# Patient Record
Sex: MALE | Employment: OTHER | URBAN - METROPOLITAN AREA
[De-identification: names, ages, dates, MRNs, and addresses within clinical notes are randomized per-mention and may not be internally consistent; named-entity substitution may affect disease eponyms.]

---

## 2017-05-11 ENCOUNTER — CARE COORDINATION (OUTPATIENT)
Dept: CARDIOLOGY | Facility: CLINIC | Age: 72
End: 2017-05-11

## 2017-05-11 DIAGNOSIS — I10 HTN (HYPERTENSION): ICD-10-CM

## 2017-05-11 RX ORDER — AMLODIPINE BESYLATE 5 MG/1
5 TABLET ORAL DAILY
Qty: 90 TABLET | Refills: 1 | Status: SHIPPED | OUTPATIENT
Start: 2017-05-11 | End: 2017-09-14

## 2017-09-14 ENCOUNTER — RADIANT APPOINTMENT (OUTPATIENT)
Dept: CARDIOLOGY | Facility: CLINIC | Age: 72
End: 2017-09-14

## 2017-09-14 ENCOUNTER — OFFICE VISIT (OUTPATIENT)
Dept: CARDIOLOGY | Facility: CLINIC | Age: 72
End: 2017-09-14
Attending: INTERNAL MEDICINE
Payer: COMMERCIAL

## 2017-09-14 ENCOUNTER — PRE VISIT (OUTPATIENT)
Dept: CARDIOLOGY | Facility: CLINIC | Age: 72
End: 2017-09-14

## 2017-09-14 VITALS
DIASTOLIC BLOOD PRESSURE: 74 MMHG | BODY MASS INDEX: 32.04 KG/M2 | OXYGEN SATURATION: 96 % | HEART RATE: 70 BPM | SYSTOLIC BLOOD PRESSURE: 175 MMHG | HEIGHT: 65 IN | WEIGHT: 192.3 LBS

## 2017-09-14 DIAGNOSIS — I35.9 AORTIC VALVE DISORDER: Primary | ICD-10-CM

## 2017-09-14 DIAGNOSIS — I10 HTN (HYPERTENSION): ICD-10-CM

## 2017-09-14 DIAGNOSIS — I35.9 AORTIC VALVE DISORDER: ICD-10-CM

## 2017-09-14 DIAGNOSIS — I10 ESSENTIAL HYPERTENSION: ICD-10-CM

## 2017-09-14 PROCEDURE — 99212 OFFICE O/P EST SF 10 MIN: CPT | Mod: 25,ZF

## 2017-09-14 PROCEDURE — 99214 OFFICE O/P EST MOD 30 MIN: CPT | Mod: 25 | Performed by: INTERNAL MEDICINE

## 2017-09-14 PROCEDURE — 99213 OFFICE O/P EST LOW 20 MIN: CPT | Mod: ZF

## 2017-09-14 RX ORDER — AMLODIPINE BESYLATE 5 MG/1
5 TABLET ORAL 2 TIMES DAILY
Qty: 180 TABLET | Refills: 3 | Status: SHIPPED | OUTPATIENT
Start: 2017-09-14 | End: 2018-10-01

## 2017-09-14 RX ADMIN — Medication 3 ML: at 15:00

## 2017-09-14 ASSESSMENT — PAIN SCALES - GENERAL: PAINLEVEL: NO PAIN (0)

## 2017-09-14 NOTE — LETTER
2017      RE: Victor Manuel Cook  [address]  [facility] [address]       Dear Colleague,    Thank you for the opportunity to participate in the care of your patient, Victor Manuel Cook, at the [facility] at [facility]. Please see a copy of my visit note below.        INITIAL CONSULTATION     CARDIOLOGIST: RAJANI Yeung      PERTINENT CLINICAL HISTORY:     Victor Manuel Cook is a very pleasant 72 year old male with H/o HTN and aortic regurgitation coming in for evaluation.  He had mild aortic regurgitation on echo dated . He was told to have repeat echo and follow up. His PCP has retired and so he had not see primary care since then.  His weight is stable as per patient. He takes norvasc for his BP.  He was active until his dogs . He wants to increase his activity level.  Denies any chest pain, shortness of breath, orthopnea or PND.  Denies leg swelling.       PAST MEDICAL HISTORY:     Past Medical History:   Diagnosis Date     Aortic regurgitation      HTN (hypertension)         PAST SURGICAL HISTORY:   No past surgical history on file.     CURRENT MEDICATIONS:     Current Outpatient Prescriptions   Medication Sig Dispense Refill     amLODIPine (NORVASC) 5 MG tablet Take 1 tablet (5 mg) by mouth 2 times daily 180 tablet 3     aspirin 81 MG tablet Take 1 tablet (81 mg) by mouth daily 90 tablet 1     [DISCONTINUED] amLODIPine (NORVASC) 5 MG tablet Take 1 tablet (5 mg) by mouth daily 90 tablet 1        ALLERGIES:   No Known Allergies     FAMILY HISTORY:   No significant cardiac history     SOCIAL HISTORY:     Social History     Social History     Marital status:      Spouse name: N/A     Number of children: 2     Years of education: N/A     Occupational History     used to work in [employer] Retired     Social History Main Topics     Smoking status: Former Smoker     Types: Cigarettes     Smokeless tobacco: None      Comment:  "Smoked rarely when he was teenager and in college.      Alcohol use No     Drug use: No     Sexual activity: Not Asked     Other Topics Concern     None     Social History Narrative        REVIEW OF SYSTEMS:     Constitutional: No fevers or chills  Skin: No new rash or itching  Eyes: No acute change in vision  Ears/Nose/Throat: No purulent rhinorrhea, new hearing loss, or new vertigo  Respiratory: No cough or hemoptysis  Cardiovascular: See HPI  Gastrointestinal: No change in appetite, vomiting, hematemesis or diarrhea  Genitourinary: No dysuria or hematuria  Musculoskeletal: No new back pain, neck pain or muscle pain  Neurologic: No new headaches, focal weakness or behavior changes  Psychiatric: No hallucinations, excessive alcohol consumption or illegal drug usage  Hematologic/Lymphatic/Immunologic: No bleeding, chills, fever, night sweats or weight loss  Endocrine: No new cold intolerance, heat intolerance, polyphagia, polydipsia or polyuria      PHYSICAL EXAMINATION:     /74 (BP Location: Right arm, Patient Position: Chair, Cuff Size: Adult Regular)  Pulse 70  Ht 1.651 m (5' 5\")  Wt 87.2 kg (192 lb 4.8 oz)  SpO2 96%  BMI 32 kg/m2    GENERAL: No acute distress.  HEENT: EOMI. Sclerae white, not injected. Nares clear. Pharynx without erythema or exudate.   Neck: No adenopathy. No thyromegaly. Symmetrical.   Heart: Regular rate and rhythm. Grade II pan systolic murmur over the aortic area.   Lungs: Clear to auscultation. No ronchi, wheezes, rales.   Abdomen: Soft, nontender, nondistended. Bowel sounds present.  Extremities: No clubbing, cyanosis, or edema.   Neurologic: Alert and oriented to person/place/time, normal speech and affect. No focal deficits.  Skin: No petechiae, purpura or rash.     LABORATORY DATA: reviewed     LIPID RESULTS:  Lab Results   Component Value Date    CHOL 212 (H) 12/16/2014    HDL 46 12/16/2014     (H) 12/16/2014    TRIG 128 12/16/2014    CHOLHDLRATIO 4.6 12/16/2014 "       LIVER ENZYME RESULTS:  Lab Results   Component Value Date    AST 25 07/19/2007    ALT 27 07/19/2007       CBC RESULTS:  No results found for: WBC, RBC, HGB, HCT, MCV, MCH, MCHC, RDW, PLT    BMP RESULTS:  Lab Results   Component Value Date     01/30/2009    POTASSIUM 4.2 01/07/2011    CHLORIDE 104 01/30/2009    CO2 27 01/30/2009    ANIONGAP 11 01/30/2009    GLC 93 01/30/2009    BUN 15 01/30/2009    CR 0.93 01/30/2009    GFRESTIMATED 82 01/30/2009    GFRESTBLACK >90 01/30/2009    SOCORRO 9.2 01/30/2009        A1C RESULTS:  No results found for: A1C    INR RESULTS:  No results found for: INR       PROCEDURES & FURTHER ASSESSMENTS:     Echocardiogram: reviewed images.  Report pending. atleast moderate aortic regurgitation       CLINICAL IMPRESSION:     Victor Manuel Cook is a 72 year old male with H/o HTN and aortic regurgitation coming in for evaluation.  -his BP was elevated today  -His Aortic regurgitation was atleast moderate based on today's echo  -to control BP by increasing norvasc to 5 mg bid  -to increase isometric exercises  -follow up echo in 1 yr for evaluating aortic regurgitation      Brad Machado MD   Interventional Cardiology Fellow      CC  Patient Care Team:  Jasiel Fritz MD as PCP - General (Family Practice)  Myles Caldwell MD as MD (Cardiology)  Emily Lopez, RN as Nurse Coordinator (Cardiology)      I have seen and examined the patient with the fellow.  I agree with the assessment and plan of the note above.I have reviewed pertinent labs.     Amadou Villareal MD  Interventional Cardiology  Pager: [phone]\

## 2017-09-14 NOTE — PROGRESS NOTES
.          INITIAL CONSULTATION     CARDIOLOGIST: RAJANI Yeung      PERTINENT CLINICAL HISTORY:     Victor Manuel Cook is a very pleasant 72 year old male with H/o HTN and aortic regurgitation coming in for evaluation.  He had mild aortic regurgitation on echo dated . He was told to have repeat echo and follow up. His PCP has retired and so he had not see primary care since then.  His weight is stable as per patient. He takes norvasc for his BP.  He was active until his dogs . He wants to increase his activity level.  Denies any chest pain, shortness of breath, orthopnea or PND.  Denies leg swelling.       PAST MEDICAL HISTORY:     Past Medical History:   Diagnosis Date     Aortic regurgitation      HTN (hypertension)         PAST SURGICAL HISTORY:   No past surgical history on file.     CURRENT MEDICATIONS:     Current Outpatient Prescriptions   Medication Sig Dispense Refill     amLODIPine (NORVASC) 5 MG tablet Take 1 tablet (5 mg) by mouth 2 times daily 180 tablet 3     aspirin 81 MG tablet Take 1 tablet (81 mg) by mouth daily 90 tablet 1     [DISCONTINUED] amLODIPine (NORVASC) 5 MG tablet Take 1 tablet (5 mg) by mouth daily 90 tablet 1        ALLERGIES:   No Known Allergies     FAMILY HISTORY:   No significant cardiac history     SOCIAL HISTORY:     Social History     Social History     Marital status:      Spouse name: N/A     Number of children: 2     Years of education: N/A     Occupational History     used to work in [employer] Retired     Social History Main Topics     Smoking status: Former Smoker     Types: Cigarettes     Smokeless tobacco: None      Comment: Smoked rarely when he was teenager and in college.      Alcohol use No     Drug use: No     Sexual activity: Not Asked     Other Topics Concern     None     Social History Narrative        REVIEW OF SYSTEMS:     Constitutional: No fevers or chills  Skin: No new rash or itching  Eyes: No acute change in  "vision  Ears/Nose/Throat: No purulent rhinorrhea, new hearing loss, or new vertigo  Respiratory: No cough or hemoptysis  Cardiovascular: See HPI  Gastrointestinal: No change in appetite, vomiting, hematemesis or diarrhea  Genitourinary: No dysuria or hematuria  Musculoskeletal: No new back pain, neck pain or muscle pain  Neurologic: No new headaches, focal weakness or behavior changes  Psychiatric: No hallucinations, excessive alcohol consumption or illegal drug usage  Hematologic/Lymphatic/Immunologic: No bleeding, chills, fever, night sweats or weight loss  Endocrine: No new cold intolerance, heat intolerance, polyphagia, polydipsia or polyuria      PHYSICAL EXAMINATION:     /74 (BP Location: Right arm, Patient Position: Chair, Cuff Size: Adult Regular)  Pulse 70  Ht 1.651 m (5' 5\")  Wt 87.2 kg (192 lb 4.8 oz)  SpO2 96%  BMI 32 kg/m2    GENERAL: No acute distress.  HEENT: EOMI. Sclerae white, not injected. Nares clear. Pharynx without erythema or exudate.   Neck: No adenopathy. No thyromegaly. Symmetrical.   Heart: Regular rate and rhythm. Grade II pan systolic murmur over the aortic area.   Lungs: Clear to auscultation. No ronchi, wheezes, rales.   Abdomen: Soft, nontender, nondistended. Bowel sounds present.  Extremities: No clubbing, cyanosis, or edema.   Neurologic: Alert and oriented to person/place/time, normal speech and affect. No focal deficits.  Skin: No petechiae, purpura or rash.     LABORATORY DATA: reviewed     LIPID RESULTS:  Lab Results   Component Value Date    CHOL 212 (H) 12/16/2014    HDL 46 12/16/2014     (H) 12/16/2014    TRIG 128 12/16/2014    CHOLHDLRATIO 4.6 12/16/2014       LIVER ENZYME RESULTS:  Lab Results   Component Value Date    AST 25 07/19/2007    ALT 27 07/19/2007       CBC RESULTS:  No results found for: WBC, RBC, HGB, HCT, MCV, MCH, MCHC, RDW, PLT    BMP RESULTS:  Lab Results   Component Value Date     01/30/2009    POTASSIUM 4.2 01/07/2011    CHLORIDE " 104 01/30/2009    CO2 27 01/30/2009    ANIONGAP 11 01/30/2009    GLC 93 01/30/2009    BUN 15 01/30/2009    CR 0.93 01/30/2009    GFRESTIMATED 82 01/30/2009    GFRESTBLACK >90 01/30/2009    SOCORRO 9.2 01/30/2009        A1C RESULTS:  No results found for: A1C    INR RESULTS:  No results found for: INR       PROCEDURES & FURTHER ASSESSMENTS:     Echocardiogram: reviewed images.  Report pending. atleast moderate aortic regurgitation       CLINICAL IMPRESSION:     Victor Manuel Cook is a 72 year old male with H/o HTN and aortic regurgitation coming in for evaluation.  -his BP was elevated today  -His Aortic regurgitation was atleast moderate based on today's echo  -to control BP by increasing norvasc to 5 mg bid  -to increase isometric exercises  -follow up echo in 1 yr for evaluating aortic regurgitation      Brad Machado MD   Interventional Cardiology Fellow      CC  Patient Care Team:  Jasiel Fritz MD as PCP - General (Family Practice)  Myles Caldwell MD as MD (Cardiology)  Emily Lopez RN as Nurse Coordinator (Cardiology)      I have seen and examined the patient with the fellow.  I agree with the assessment and plan of the note above.I have reviewed pertinent labs.     Amadou Villareal MD  Interventional Cardiology  Pager: [phone]\

## 2017-09-14 NOTE — MR AVS SNAPSHOT
"              After Visit Summary   9/14/2017    Victor Manuel Cook    MRN: [id number]           Patient Information     Date Of Birth          1945        Visit Information        Provider Department      9/14/2017 3:30 PM Amadou Villareal MD [facility]        Today's Diagnoses     Aortic valve disorder    -  1    Essential hypertension        HTN (hypertension)           Follow-ups after your visit        Future tests that were ordered for you today     Open Future Orders        Priority Expected Expires Ordered    Echocardiogram Complete Routine 9/14/2018 12/14/2018 9/14/2017            Who to contact     If you have questions or need follow up information about today's clinic visit or your schedule please contact [facility] directly at [phone].  Normal or non-critical lab and imaging results will be communicated to you by Neptunehart, letter or phone within 4 business days after the clinic has received the results. If you do not hear from us within 7 days, please contact the clinic through Neptunehart or phone. If you have a critical or abnormal lab result, we will notify you by phone as soon as possible.  Submit refill requests through "[employer] or call your pharmacy and they will forward the refill request to us. Please allow 3 business days for your refill to be completed.          Additional Information About Your Visit        MyChart Information     "[employer] lets you send messages to your doctor, view your test results, renew your prescriptions, schedule appointments and more. To sign up, go to [email or web address] . Click on \"Log in\" on the left side of the screen, which will take you to the Welcome page. Then click on \"Sign up Now\" on the right side of the page.     You will be asked to enter the access code listed below, as well as some personal information. Please follow the directions to create your username and password.     Your access code is: [email or web address]  Expires: " "2017  6:30 AM     Your access code will  in 90 days. If you need help or a new code, please call your [facility] clinic or [phone].        Care EveryWhere ID     This is your Care EveryWhere ID. This could be used by other organizations to access your [facility] medical records  [id number]        Your Vitals Were     Pulse Height Pulse Oximetry BMI (Body Mass Index)          70 1.651 m (5' 5\") 96% 32 kg/m2         Blood Pressure from Last 3 Encounters:   17 175/74   10/20/15 151/68   10/16/14 144/68    Weight from Last 3 Encounters:   17 87.2 kg (192 lb 4.8 oz)   10/20/15 86.5 kg (190 lb 11.2 oz)   10/16/14 82.7 kg (182 lb 6.4 oz)                 Today's Medication Changes          These changes are accurate as of: 17  3:39 PM.  If you have any questions, ask your nurse or doctor.               These medicines have changed or have updated prescriptions.        Dose/Directions    amLODIPine 5 MG tablet   Commonly known as:  NORVASC   This may have changed:  when to take this   Used for:  HTN (hypertension)   Changed by:  Amadou Villareal MD        Dose:  5 mg   Take 1 tablet (5 mg) by mouth 2 times daily   Quantity:  180 tablet   Refills:  3            Where to get your medicines      These medications were sent to [facility] [place] [address] [facility] [address]     Phone:  [phone]     amLODIPine 5 MG tablet                Primary Care Provider Office Phone # Fax #    Jasiel Fritz [phone]       [address] [place] [address]        Equal Access to Services     NABIL PINK : Shasta Farooq, ayden del cid, tray chahalalellen white. So [facility] [phone].    ATENCIÓN: Si habla español, tiene a laurent disposición servicios gratuitos de asistencia lingüística. Llame al [phone].    We comply with applicable federal " civil rights laws and [place] laws. We do not discriminate on the basis of race, color, national origin, age, disability sex, sexual orientation or gender identity.            Thank you!     Thank you for choosing [facility]  for your care. Our goal is always to provide you with excellent care. Hearing back from our patients is one way we can continue to improve our services. Please take a few minutes to complete the written survey that you may receive in the mail after your visit with us. Thank you!             Your Updated Medication List - Protect others around you: Learn how to safely use, store and throw away your medicines at www.disposemymeds.org.          This list is accurate as of: 9/14/17  3:39 PM.  Always use your most recent med list.                   Brand Name Dispense Instructions for use Diagnosis    amLODIPine 5 MG tablet    NORVASC    180 tablet    Take 1 tablet (5 mg) by mouth 2 times daily    HTN (hypertension)       aspirin 81 MG tablet     90 tablet    Take 1 tablet (81 mg) by mouth daily    Cardiovascular risk factor

## 2018-10-01 DIAGNOSIS — I10 HTN (HYPERTENSION): ICD-10-CM

## 2018-10-02 RX ORDER — AMLODIPINE BESYLATE 5 MG/1
TABLET ORAL
Qty: 180 TABLET | Refills: 0 | Status: SHIPPED | OUTPATIENT
Start: 2018-10-02 | End: 2019-01-14

## 2019-01-14 DIAGNOSIS — I10 HTN (HYPERTENSION): ICD-10-CM

## 2019-01-23 RX ORDER — AMLODIPINE BESYLATE 5 MG/1
5 TABLET ORAL DAILY
Qty: 90 TABLET | Refills: 0 | Status: SHIPPED | OUTPATIENT
Start: 2019-01-23 | End: 2019-04-23

## 2019-01-23 NOTE — TELEPHONE ENCOUNTER
Spoke with Victor Manuel.  He will be scheduled for an echo, and then establish care with a new cardiologist.  A 3 month refill for amlodipine will be sent to his pharmacy.  The new cardiologist will take over the prescriptions.

## 2019-01-29 ENCOUNTER — DOCUMENTATION ONLY (OUTPATIENT)
Dept: CARE COORDINATION | Facility: CLINIC | Age: 74
End: 2019-01-29

## 2019-02-18 ENCOUNTER — ANCILLARY PROCEDURE (OUTPATIENT)
Dept: CARDIOLOGY | Facility: CLINIC | Age: 74
End: 2019-02-18
Attending: INTERNAL MEDICINE
Payer: COMMERCIAL

## 2019-02-18 VITALS
SYSTOLIC BLOOD PRESSURE: 164 MMHG | OXYGEN SATURATION: 94 % | DIASTOLIC BLOOD PRESSURE: 76 MMHG | BODY MASS INDEX: 30.74 KG/M2 | HEART RATE: 65 BPM | WEIGHT: 191.3 LBS | HEIGHT: 66 IN

## 2019-02-18 DIAGNOSIS — I35.9 AORTIC VALVE DISORDER: Primary | ICD-10-CM

## 2019-02-18 DIAGNOSIS — I10 ESSENTIAL HYPERTENSION: ICD-10-CM

## 2019-02-18 DIAGNOSIS — I35.9 AORTIC VALVE DISORDER: ICD-10-CM

## 2019-02-18 PROCEDURE — 99214 OFFICE O/P EST MOD 30 MIN: CPT | Mod: ZP | Performed by: INTERNAL MEDICINE

## 2019-02-18 PROCEDURE — 93010 ELECTROCARDIOGRAM REPORT: CPT | Mod: ZP | Performed by: INTERNAL MEDICINE

## 2019-02-18 PROCEDURE — 93005 ELECTROCARDIOGRAM TRACING: CPT | Mod: ZF

## 2019-02-18 PROCEDURE — G0463 HOSPITAL OUTPT CLINIC VISIT: HCPCS | Mod: 25,ZF

## 2019-02-18 RX ORDER — LISINOPRIL 10 MG/1
10 TABLET ORAL DAILY
Qty: 90 TABLET | Refills: 3 | Status: SHIPPED | OUTPATIENT
Start: 2019-02-18 | End: 2020-01-20

## 2019-02-18 RX ADMIN — Medication 5 ML: at 14:00

## 2019-02-18 ASSESSMENT — MIFFLIN-ST. JEOR: SCORE: 1550.48

## 2019-02-18 ASSESSMENT — PAIN SCALES - GENERAL: PAINLEVEL: NO PAIN (0)

## 2019-02-18 NOTE — LETTER
2/18/2019      RE: Victor Manuel Cook  [address]  [facility] [address]       Dear Colleague,    Thank you for the opportunity to participate in the care of your patient, Victor Manuel Cook, at the [facility]. Please see a copy of my visit note below.    [facility]  CARDIOVASCULAR MEDICINE CLINIC NOTE    Referring Provider: Amadou Villareal   Primary Care Provider: Jasiel Fritz     Patient Name: Victor Manuel Cook   MRN: [id number]     PERTINENT CLINICAL HISTORY:   Victor Manuel Cook is a 73 year old gentleman w/ h/o aortic insufficiency presenting to clinic for routine follow-up.  He is asymptomatic.  He is active and takes his medication regularly.  He denies any chest pain, palpitations, SOB, edema, orthopnea, PND, syncope or near syncope.  He does note that his blood pressure has been high on recent measurements.      PAST MEDICAL HISTORY:     Past Medical History:   Diagnosis Date     Aortic regurgitation      HTN (hypertension)         PAST SURGICAL HISTORY:   No past surgical history on file.     CURRENT MEDICATIONS:     Current Outpatient Medications   Medication Sig Dispense Refill     amLODIPine (NORVASC) 5 MG tablet Take 1 tablet (5 mg) by mouth daily 90 tablet 0     aspirin 81 MG tablet Take 1 tablet (81 mg) by mouth daily 90 tablet 1      ALLERGIES:   No Known Allergies     FAMILY HISTORY:   No family history on file.     SOCIAL HISTORY:     Social History     Socioeconomic History     Marital status:      Spouse name: Not on file     Number of children: 2     Years of education: Not on file     Highest education level: Not on file   Social Needs     Financial resource strain: Not on file     Food insecurity - worry: Not on file     Food insecurity - inability: Not on file     Transportation needs - medical: Not on file     Transportation needs - non-medical: Not on file   Occupational History      "Occupation: used to work in [employer]     Employer: RETIRED   Tobacco Use     Smoking status: Former Smoker     Types: Cigarettes     Smokeless tobacco: Never Used     Tobacco comment: Smoked rarely when he was teenager and in college.    Substance and Sexual Activity     Alcohol use: No     Drug use: No     Sexual activity: Not on file   Other Topics Concern     Parent/sibling w/ CABG, MI or angioplasty before 65F 55M? Not Asked   Social History Narrative     Not on file      REVIEW OF SYSTEMS:   A comprehensive review of systems was performed and negative unless otherwise noted in the HPI above.      PHYSICAL EXAMINATION:   /76 (BP Location: Right arm, Patient Position: Chair, Cuff Size: Adult Regular)   Pulse 65   Ht 1.676 m (5' 6\")   Wt 86.8 kg (191 lb 4.8 oz)   SpO2 94%   BMI 30.88 kg/m     Body mass index is 30.88 kg/m .  Wt Readings from Last 2 Encounters:   02/18/19 86.8 kg (191 lb 4.8 oz)   09/14/17 87.2 kg (192 lb 4.8 oz)     Constitutional: no acute distress, pleasant and cooperative, appears overall well.  Eyes: EOMI, PERRLA, sclera white, conjunctiva clear, without icterus or pallor   Cardiovascular: RRR nl S1S2, JVP not elevated, extremities with no edema or cyanosis  Respiratory: clear to auscultation and percussion bilaterally anterior and posterior  Gastrointestinal: soft, nontender, non distended, no hepatosplenomegaly or masses  Musculoskeletal: normal muscle bulk and tone, joints   Skin: normal skin appearance without worrisome lesions.   Neurologic: AOx3, CNII-XII intact, reflexes are normal in the biceps, patellar, and achilles tendons, sensation and strength intact in the b/l upper and lower extremities.  gait smooth and symmetric  Psychiatric: appropriate affect, eye contact, intact thought and speech     LABORATORY DATA:     LIPID RESULTS:  Recent Labs   Lab Test 12/16/14  1241 01/07/11  1049   CHOL 212* 239*   HDL 46 41   * 172*   TRIG 128 129   CHOLHDLRATIO 4.6 " 5.8*      BMP RESULTS:  Recent Labs   Lab Test 01/07/11  1049   POTASSIUM 4.2      PROCEDURES & FURTHER ASSESSMENTS:     ECHO:  2/18/19  Poor acoustic windows.  Global and regional left ventricular function is normal with an EF of 60-65%.  Right ventricular function, chamber size, wall motion, and thickness are  normal.  Mild to moderate aortic insufficiency is present.  The inferior vena cava is normal.  No pericardial effusion is present.  There has been no change.     CLINICAL IMPRESSION:   Victor Manuel Cook is a 73 year old gentleman w/ h/o aortic insufficiency presenting to clinic for routine follow-up.  His TTE demonstrates stable AI.  His BP is elevated today.    Aortic Insufficiency: Mild-to-moderate.  Stable by TTE.  Asymptomatic.  --Continue to monitor by yearly TTE    HTN:   --Continue amlodipine 5mg Qday  --Start lisinopril 10mg Qday  --Will call in 2 weeks to discuss blood pressures and consider further adjustments    Follow-up: 1 year with TTE    Thank you for allowing us to take part in the care of this very pleasant patient.  Please do not hesitate to call if any further questions or concerns arise.    Masoud Molina MD, PhD  Interventional/Critical Care Cardiology  [phone]    February 18, 2019      CC  Patient Care Team:  Jasiel Fritz MD as PCP - General (Family Practice)  RAJANI MUNOZ

## 2019-02-18 NOTE — PATIENT INSTRUCTIONS
Patient Instructions:  It was a pleasure to see you in the cardiology clinic today.      If you have any questions, call  Katheryn Flores RN, at [phone].  Press Option #1 for the [facility], and then press Option #3 for nursing.  We are encouraging the use of Promoltahart to communicate with your HealthCare Provider    Note the new medications: Lisinopril 10 mg by mouth everyday  Stop the following medications: none    The results from today include: none  Please follow up with Dr. Masoud Molina with a limited ECHO      If you have an urgent need after hours (8:00 am to 4:30 pm) please call [phone] and ask for the cardiology fellow on call.

## 2019-02-18 NOTE — NURSING NOTE
Cardiac Testing: Patient given instructions regarding  echocardiogram . Discussed purpose, preparation, procedure and when to expect results reported back to the patient. Patient demonstrated understanding of this information and agreed to call with further questions or concerns.  Med Reconcile: Reviewed and verified all current medications with the patient. The updated medication list was printed and given to the patient.  New Medication: Patient was educated regarding newly prescribed medication, including discussion of  the indication, administration, side effects, and when to report to MD or RN. Patient demonstrated understanding of this information and agreed to call with further questions or concerns.  Return Appointment: Patient given instructions regarding scheduling next clinic visit. Patient demonstrated understanding of this information and agreed to call with further questions or concerns.  Patient stated he understood all health information given and agreed to call with further questions or concerns.

## 2019-02-18 NOTE — NURSING NOTE
Chief Complaint   Patient presents with     Follow Up     establish care for HTN and aortic regurgitation, former [facility] patient     Vitals were taken and medications were reconciled.    2:39 PM Harshad Dailey, Student CMA

## 2019-02-18 NOTE — PROGRESS NOTES
[facility]  CARDIOVASCULAR MEDICINE CLINIC NOTE    Referring Provider: Amadou Villareal   Primary Care Provider: Jasiel Fritz     Patient Name: Victor Manuel Cook   MRN: [id number]     PERTINENT CLINICAL HISTORY:   Victor Manuel Cook is a 73 year old gentleman w/ h/o aortic insufficiency presenting to clinic for routine follow-up.  He is asymptomatic.  He is active and takes his medication regularly.  He denies any chest pain, palpitations, SOB, edema, orthopnea, PND, syncope or near syncope.  He does note that his blood pressure has been high on recent measurements.      PAST MEDICAL HISTORY:     Past Medical History:   Diagnosis Date     Aortic regurgitation      HTN (hypertension)         PAST SURGICAL HISTORY:   No past surgical history on file.     CURRENT MEDICATIONS:     Current Outpatient Medications   Medication Sig Dispense Refill     amLODIPine (NORVASC) 5 MG tablet Take 1 tablet (5 mg) by mouth daily 90 tablet 0     aspirin 81 MG tablet Take 1 tablet (81 mg) by mouth daily 90 tablet 1      ALLERGIES:   No Known Allergies     FAMILY HISTORY:   No family history on file.     SOCIAL HISTORY:     Social History     Socioeconomic History     Marital status:      Spouse name: Not on file     Number of children: 2     Years of education: Not on file     Highest education level: Not on file   Social Needs     Financial resource strain: Not on file     Food insecurity - worry: Not on file     Food insecurity - inability: Not on file     Transportation needs - medical: Not on file     Transportation needs - non-medical: Not on file   Occupational History     Occupation: used to work in [employer]     Employer: RETIRED   Tobacco Use     Smoking status: Former Smoker     Types: Cigarettes     Smokeless tobacco: Never Used     Tobacco comment: Smoked rarely when he was teenager and in college.    Substance and Sexual Activity     Alcohol use: No     Drug use: No     Sexual  "activity: Not on file   Other Topics Concern     Parent/sibling w/ CABG, MI or angioplasty before 65F 55M? Not Asked   Social History Narrative     Not on file      REVIEW OF SYSTEMS:   A comprehensive review of systems was performed and negative unless otherwise noted in the HPI above.      PHYSICAL EXAMINATION:   /76 (BP Location: Right arm, Patient Position: Chair, Cuff Size: Adult Regular)   Pulse 65   Ht 1.676 m (5' 6\")   Wt 86.8 kg (191 lb 4.8 oz)   SpO2 94%   BMI 30.88 kg/m    Body mass index is 30.88 kg/m .  Wt Readings from Last 2 Encounters:   02/18/19 86.8 kg (191 lb 4.8 oz)   09/14/17 87.2 kg (192 lb 4.8 oz)     Constitutional: no acute distress, pleasant and cooperative, appears overall well.  Eyes: EOMI, PERRLA, sclera white, conjunctiva clear, without icterus or pallor   Cardiovascular: RRR nl S1S2, JVP not elevated, extremities with no edema or cyanosis  Respiratory: clear to auscultation and percussion bilaterally anterior and posterior  Gastrointestinal: soft, nontender, non distended, no hepatosplenomegaly or masses  Musculoskeletal: normal muscle bulk and tone, joints   Skin: normal skin appearance without worrisome lesions.   Neurologic: AOx3, CNII-XII intact, reflexes are normal in the biceps, patellar, and achilles tendons, sensation and strength intact in the b/l upper and lower extremities.  gait smooth and symmetric  Psychiatric: appropriate affect, eye contact, intact thought and speech     LABORATORY DATA:     LIPID RESULTS:  Recent Labs   Lab Test 12/16/14  1241 01/07/11  1049   CHOL 212* 239*   HDL 46 41   * 172*   TRIG 128 129   CHOLHDLRATIO 4.6 5.8*      BMP RESULTS:  Recent Labs   Lab Test 01/07/11  1049   POTASSIUM 4.2      PROCEDURES & FURTHER ASSESSMENTS:     ECHO:  2/18/19  Poor acoustic windows.  Global and regional left ventricular function is normal with an EF of 60-65%.  Right ventricular function, chamber size, wall motion, and thickness are  normal.  Mild " to moderate aortic insufficiency is present.  The inferior vena cava is normal.  No pericardial effusion is present.  There has been no change.     CLINICAL IMPRESSION:   Victor Manuel Cook is a 73 year old gentleman w/ h/o aortic insufficiency presenting to clinic for routine follow-up.  His TTE demonstrates stable AI.  His BP is elevated today.    Aortic Insufficiency: Mild-to-moderate.  Stable by TTE.  Asymptomatic.  --Continue to monitor by yearly TTE    HTN:   --Continue amlodipine 5mg Qday  --Start lisinopril 10mg Qday  --Will call in 2 weeks to discuss blood pressures and consider further adjustments    Follow-up: 1 year with TTE    Thank you for allowing us to take part in the care of this very pleasant patient.  Please do not hesitate to call if any further questions or concerns arise.    Masoud Molina MD, PhD  Interventional/Critical Care Cardiology  [phone]    February 18, 2019      CC  Patient Care Team:  Jasiel Fritz MD as PCP - General (Family Practice)  RAJANI MUNOZ

## 2019-02-18 NOTE — LETTER
February 18, 2019      TO: Victor Manuel Cook  [id number]  [facility] [address]         Dear Victor Manuel,    Enclosed are a few names of physicians and advanced practice providers that deal in gerontology.    It was a pleasure to see you at your last clinic visit. Please do not hesitate to call me if you have any questions or concerns.    Sincerely,    Katheryn Flores RN  Nurse Coordinator for Masoud Molina MD  [phone]                                    Lois Vargas MD  Family Medicine Physician  Specialties  Geriatric Services  Location  [facility]  [place]  [phone]    Libby Martins MD  Internal Medicine Physician  Specialties  Geriatric Services  Location  [facility] [place]  [phone]    Brooke Yoon MD  Internal Medicine Physician  Specialties  Geriatric Services  Location  [facility] [place]  [phone]    Jamison Corona APRN, CNP  Certified Nurse Practitioner  Specialties  Geriatric Services  Location  [facility] [place]  [phone]    JACINTA Frazier-NP  Specialties  Geriatric Services  Location  [facility]        Guille Tran CNP  Nurse Practitioner  Specialties  Geriatric Services  Location  [facility]    Salvador Cox MD  Family Medicine Physician  Specialties  Geriatric Services  Location  [facility] [place]  [phone]    Maria R Choi MD  Internal Medicine Physician  Specialties  Geriatric Services  Location  [facility] [place]  [phone]    Miles Drew APRN, CNP  Nurse Practitioner  Specialties  Geriatric Services  Location  [facility] [place]  [phone]    Lois Vargas MD  Family Medicine  Physician  Specialties  Geriatric Services  Location  [facility]  [place]  [phone]

## 2019-02-19 LAB — INTERPRETATION ECG - MUSE: NORMAL

## 2019-04-23 DIAGNOSIS — I10 HTN (HYPERTENSION): ICD-10-CM

## 2019-04-23 RX ORDER — AMLODIPINE BESYLATE 5 MG/1
5 TABLET ORAL DAILY
Qty: 90 TABLET | Refills: 0 | Status: SHIPPED | OUTPATIENT
Start: 2019-04-23 | End: 2019-08-04

## 2019-04-23 NOTE — TELEPHONE ENCOUNTER
[facility] Call Center    Phone Message    May a detailed message be left on voicemail: yes    Reason for Call: Medication Refill Request    Has the patient contacted the pharmacy for the refill? Yes   Name of medication being requested: amLODIPine (NORVASC) 5 MG tablet  Provider who prescribed the medication: Dr. Villareal  Pharmacy: [facility]  Date medication is needed: 04/24         Action Taken: Message routed to:  [facility] Cardiology

## 2019-08-04 DIAGNOSIS — I10 HTN (HYPERTENSION): ICD-10-CM

## 2019-08-06 ENCOUNTER — TELEPHONE (OUTPATIENT)
Dept: CARDIOLOGY | Facility: CLINIC | Age: 74
End: 2019-08-06

## 2019-08-06 DIAGNOSIS — I35.9 AORTIC VALVE DISORDER: Primary | ICD-10-CM

## 2019-08-06 RX ORDER — AMLODIPINE BESYLATE 5 MG/1
TABLET ORAL
Qty: 90 TABLET | Refills: 0 | Status: SHIPPED | OUTPATIENT
Start: 2019-08-06 | End: 2019-10-16

## 2019-08-06 NOTE — LETTER
August 6, 2019      TO: Victor Manuel Cook  [id number]  [facility] [address]         Dear Victor Manuel,    Per our medication protocol, you will need to have some basic labs drawn for further refills. They do not have to be fasting. Please go to any [facility] as the orders have been placed and they can draw them at your convenience.    Please do not hesitate to call me if you have any questions or concerns.    Sincerely,    Katheryn Flores RN  Nurse Coordinator for Masoud Molina MD  [phone]

## 2019-08-06 NOTE — TELEPHONE ENCOUNTER
Last Clinic Visit:  2/18/19  FYI : Can refill for 3 months if BP > 140/90, and refer to CARD RF for follow up AND 90 DAY RF * NO CREATININE (REPEAT)

## 2019-10-08 DIAGNOSIS — I35.9 AORTIC VALVE DISORDER: ICD-10-CM

## 2019-10-08 LAB
ANION GAP SERPL CALCULATED.3IONS-SCNC: 6 MMOL/L (ref 3–14)
BUN SERPL-MCNC: 16 MG/DL (ref 7–30)
CALCIUM SERPL-MCNC: 8.5 MG/DL (ref 8.5–10.1)
CHLORIDE SERPL-SCNC: 106 MMOL/L (ref 94–109)
CO2 SERPL-SCNC: 25 MMOL/L (ref 20–32)
CREAT SERPL-MCNC: 0.87 MG/DL (ref 0.66–1.25)
ERYTHROCYTE [DISTWIDTH] IN BLOOD BY AUTOMATED COUNT: 13.3 % (ref 10–15)
GFR SERPL CREATININE-BSD FRML MDRD: 84 ML/MIN/{1.73_M2}
GLUCOSE SERPL-MCNC: 98 MG/DL (ref 70–99)
HCT VFR BLD AUTO: 45.9 % (ref 40–53)
HGB BLD-MCNC: 15.4 G/DL (ref 13.3–17.7)
MCH RBC QN AUTO: 29.7 PG (ref 26.5–33)
MCHC RBC AUTO-ENTMCNC: 33.6 G/DL (ref 31.5–36.5)
MCV RBC AUTO: 88 FL (ref 78–100)
PLATELET # BLD AUTO: 248 10E9/L (ref 150–450)
POTASSIUM SERPL-SCNC: 3.7 MMOL/L (ref 3.4–5.3)
RBC # BLD AUTO: 5.19 10E12/L (ref 4.4–5.9)
SODIUM SERPL-SCNC: 137 MMOL/L (ref 133–144)
WBC # BLD AUTO: 5.5 10E9/L (ref 4–11)

## 2019-10-16 DIAGNOSIS — I10 HTN (HYPERTENSION): ICD-10-CM

## 2019-10-21 RX ORDER — AMLODIPINE BESYLATE 5 MG/1
5 TABLET ORAL DAILY
Qty: 90 TABLET | Refills: 0 | Status: SHIPPED | OUTPATIENT
Start: 2019-10-21 | End: 2020-01-20

## 2019-10-21 NOTE — TELEPHONE ENCOUNTER
"    AMLODIPINE  Last Written Prescription Date:  8/6/2019  Last Fill Quantity: 90,   # refills: 0  Last Office Visit : 2/19/2019  Future Office visit:  I year with TTE not yet scheduled.    Routing refill request to provider for review/approval because:  Blood pressure out of range Plan at 2/18/2019\" HTN:   --Continue amlodipine 5mg Qday  --Start lisinopril 10mg Qday  --Will call in 2 weeks to discuss blood pressures and consider further adjustments\"  No phone note re: elevated BP noted since last visit. Refilled x 90 days per protocol and FYI to Cards refills.        "

## 2020-01-18 DIAGNOSIS — I10 HTN (HYPERTENSION): Primary | ICD-10-CM

## 2020-01-18 DIAGNOSIS — I35.9 AORTIC VALVE DISORDER: ICD-10-CM

## 2020-01-20 RX ORDER — LISINOPRIL 10 MG/1
10 TABLET ORAL DAILY
Qty: 90 TABLET | Refills: 0 | Status: SHIPPED | OUTPATIENT
Start: 2020-01-20 | End: 2020-04-13

## 2020-01-20 RX ORDER — AMLODIPINE BESYLATE 5 MG/1
5 TABLET ORAL DAILY
Qty: 90 TABLET | Refills: 0 | Status: SHIPPED | OUTPATIENT
Start: 2020-01-20 | End: 2020-04-13

## 2020-01-31 ENCOUNTER — TELEPHONE (OUTPATIENT)
Dept: CARDIOLOGY | Facility: CLINIC | Age: 75
End: 2020-01-31

## 2020-01-31 NOTE — TELEPHONE ENCOUNTER
[facility] Call Center    Phone Message    May a detailed message be left on voicemail: yes    Reason for Call: Other: Pt calling in trying to get echo limited scheduled order expires before he needs it please extend order      Action Taken: Message routed to:  [facility] rylie cardio

## 2020-04-02 ENCOUNTER — TELEPHONE (OUTPATIENT)
Dept: CARDIOLOGY | Facility: CLINIC | Age: 75
End: 2020-04-02

## 2020-04-08 ENCOUNTER — TELEPHONE (OUTPATIENT)
Dept: CARDIOLOGY | Facility: CLINIC | Age: 75
End: 2020-04-08

## 2020-04-13 ENCOUNTER — VIRTUAL VISIT (OUTPATIENT)
Dept: CARDIOLOGY | Facility: CLINIC | Age: 75
End: 2020-04-13
Attending: INTERNAL MEDICINE
Payer: COMMERCIAL

## 2020-04-13 DIAGNOSIS — I10 ESSENTIAL HYPERTENSION: ICD-10-CM

## 2020-04-13 DIAGNOSIS — Z91.89 CARDIOVASCULAR RISK FACTOR: ICD-10-CM

## 2020-04-13 DIAGNOSIS — I35.9 AORTIC VALVE DISORDER: Primary | ICD-10-CM

## 2020-04-13 PROCEDURE — 99214 OFFICE O/P EST MOD 30 MIN: CPT | Mod: TEL | Performed by: INTERNAL MEDICINE

## 2020-04-13 RX ORDER — LISINOPRIL 10 MG/1
10 TABLET ORAL 2 TIMES DAILY
Qty: 180 TABLET | Refills: 3 | Status: SHIPPED | OUTPATIENT
Start: 2020-04-13 | End: 2021-05-25

## 2020-04-13 RX ORDER — AMLODIPINE BESYLATE 5 MG/1
5 TABLET ORAL DAILY
Qty: 90 TABLET | Refills: 3 | Status: SHIPPED | OUTPATIENT
Start: 2020-04-13 | End: 2021-05-25

## 2020-04-13 NOTE — PATIENT INSTRUCTIONS
Patient Instructions:  It was a pleasure to visit with you in the cardiology clinic today.      If you have any questions, call  Katheryn Flores RN, at [phone].  Press Option #1 for the [facility], and then press Option #4  We are encouraging the use of [employer] to communicate with your HealthCare Provider    Note the new medications: increase the lisinopril to 10 mg by mouth twice daily  Stop the following medications: none    The results from today include: pending ECHO post Covid19, we will call you when procedures start again. Would like for you to get labs for medication protocol renewal sometime this year, including a fasting lipid panel  Please follow up with Dr. Masoud Molina in one year      If you have an urgent need after hours (8:00 am to 4:30 pm) please call [phone] and ask for the cardiology fellow on call.      Renewed scripts and increased the Lisinopril amount so you'll have 90 days worth and three refills

## 2020-04-14 NOTE — PROGRESS NOTES
"Victor Manuel Cook is a 74 year old male who is being evaluated via a billable telephone visit.      The patient has been notified of following:     \"This telephone visit will be conducted via a call between you and your physician/provider. We have found that certain health care needs can be provided without the need for a physical exam.  This service lets us provide the care you need with a short phone conversation.  If a prescription is necessary we can send it directly to your pharmacy.  If lab work is needed we can place an order for that and you can then stop by our lab to have the test done at a later time.    Telephone visits are billed at different rates depending on your insurance coverage. During this emergency period, for some insurers they may be billed the same as an in-person visit.  Please reach out to your insurance provider with any questions.    If during the course of the call the physician/provider feels a telephone visit is not appropriate, you will not be charged for this service.\"    Patient has given verbal consent for Telephone visit?  Yes    How would you like to obtain your AVS? Mail a copy      [facility]  CARDIOVASCULAR MEDICINE TELEPHONE VISIT NOTE    Referring Provider: Referred Self   Primary Care Provider: Jasiel Fritz     Patient Name: Victor Manuel Cook   MRN: [id number]     PERTINENT CLINICAL HISTORY:   Victor Manuel Cook is a 74 year old gentleman w/ h/o aortic insufficiency presenting to clinic for routine follow-up.  He is asymptomatic.  He is active and takes his medication regularly.  He has noted increasing blood pressures in recent days with no changes in diet.  He is less active due to COVID concerns.  He denies any chest pain, palpitations, SOB, edema, orthopnea, PND, syncope or near syncope.  He does note that his blood pressure has been high on recent measurements.      PAST MEDICAL HISTORY:     Past Medical History:   Diagnosis Date     Aortic " regurgitation      HTN (hypertension)         PAST SURGICAL HISTORY:   No past surgical history on file.     CURRENT MEDICATIONS:     Current Outpatient Medications   Medication Sig Dispense Refill     amLODIPine (NORVASC) 5 MG tablet Take 1 tablet (5 mg) by mouth daily 90 tablet 3     aspirin (ASA) 81 MG tablet Take 1 tablet (81 mg) by mouth daily 90 tablet 3     lisinopril (ZESTRIL) 10 MG tablet Take 1 tablet (10 mg) by mouth 2 times daily 180 tablet 3        ALLERGIES:   No Known Allergies     FAMILY HISTORY:   No family history on file.     SOCIAL HISTORY:     Social History     Socioeconomic History     Marital status:      Spouse name: Not on file     Number of children: 2     Years of education: Not on file     Highest education level: Not on file   Occupational History     Occupation: used to work in [employer]     Employer: RETIRED   Social Needs     Financial resource strain: Not on file     Food insecurity     Worry: Not on file     Inability: Not on file     Transportation needs     Medical: Not on file     Non-medical: Not on file   Tobacco Use     Smoking status: Former Smoker     Types: Cigarettes     Smokeless tobacco: Never Used     Tobacco comment: Smoked rarely when he was teenager and in college.    Substance and Sexual Activity     Alcohol use: No     Drug use: No     Sexual activity: Not on file   Lifestyle     Physical activity     Days per week: Not on file     Minutes per session: Not on file     Stress: Not on file   Relationships     Social connections     Talks on phone: Not on file     Gets together: Not on file     Attends Latter-day service: Not on file     Active member of club or organization: Not on file     Attends meetings of clubs or organizations: Not on file     Relationship status: Not on file     Intimate partner violence     Fear of current or ex partner: Not on file     Emotionally abused: Not on file     Physically abused: Not on file     Forced sexual  activity: Not on file   Other Topics Concern     Parent/sibling w/ CABG, MI or angioplasty before 65F 55M? Not Asked   Social History Narrative     Not on file        REVIEW OF SYSTEMS:   A comprehensive review of systems was performed and negative unless otherwise noted in the HPI above.      PHYSICAL EXAMINATION:   No vitals were collected as this was a telephone visit.    Constitutional: no acute distress, pleasant and cooperative, appears overall well.   Neurologic: AOx3  Psychiatric: appropriate affect, intact thought and speech       LABORATORY DATA:     LIPID RESULTS:  Recent Labs   Lab Test 12/16/14  1241   CHOL 212*   HDL 46   *   TRIG 128   CHOLHDLRATIO 4.6        CBC RESULTS:  Recent Labs   Lab Test 10/08/19  1430   WBC 5.5   HGB 15.4   HCT 45.9          BMP RESULTS:  Recent Labs   Lab Test 10/08/19  1430      POTASSIUM 3.7   CHLORIDE 106   CO2 25   ANIONGAP 6   GLC 98   BUN 16   CR 0.87   SOCORRO 8.5        PROCEDURES & FURTHER ASSESSMENTS:     ECHO:  2/18/19  Poor acoustic windows.  Global and regional left ventricular function is normal with an EF of 60-65%.  Right ventricular function, chamber size, wall motion, and thickness are  normal.  Mild to moderate aortic insufficiency is present.  The inferior vena cava is normal.  No pericardial effusion is present.  There has been no change.     CLINICAL IMPRESSION:   Victor Manuel Cook is a 74 year old gentleman w/ h/o aortic insufficiency presenting to clinic for routine follow-up.  He was not able to get his TTE.  He will have his TTE when we have slots available.     Aortic Insufficiency: Mild-to-moderate.  Stable by TTE in 2019.  Asymptomatic.  --Report for TTE when slots available  --Afterload reduction as below     HTN:   --Continue amlodipine 5mg Qday  --Increase lisinopril to 10mg BID  --Continue to monitor at home  --Continue ASA 81mg Qday     Follow-up: 1 year with TTE    Thank you for allowing us to take part in the care of this  very pleasant patient.  Please do not hesitate to call if any further questions or concerns arise.    Masoud Molina MD, PhD  Interventional/Critical Care Cardiology  [phone]    April 14, 2020      CC  Patient Care Team:  Jasiel Fritz MD as PCP - General (Family Practice)  SELF, REFERRED      Phone call duration: 20 minutes    Masoud Molina MD, PhD

## 2021-02-01 ENCOUNTER — TELEPHONE (OUTPATIENT)
Dept: CARDIOLOGY | Facility: CLINIC | Age: 76
End: 2021-02-01

## 2021-02-01 NOTE — TELEPHONE ENCOUNTER
[facility] Call Center    Phone Message    May a detailed message be left on voicemail: no     Reason for Call: Other: Delvin, Victor Manuel calling about Vaccination Questions.  Please call [phone] Victor Manuel.     Action Taken: Message routed to:  [facility] Cardiology    Travel Screening: Not Applicable

## 2021-02-04 NOTE — TELEPHONE ENCOUNTER
Patient calling in to find out when anymore appointments for COVID vaccinations will be available. I let him know that I do not have that information. He has signed up for through the state and is trying to sign up on [employer].

## 2021-03-10 DIAGNOSIS — Z91.89 CARDIOVASCULAR RISK FACTOR: ICD-10-CM

## 2021-03-10 DIAGNOSIS — I35.9 AORTIC VALVE DISORDER: Primary | ICD-10-CM

## 2021-03-28 ENCOUNTER — HEALTH MAINTENANCE LETTER (OUTPATIENT)
Age: 76
End: 2021-03-28

## 2021-05-21 ENCOUNTER — TELEPHONE (OUTPATIENT)
Dept: CARDIOLOGY | Facility: CLINIC | Age: 76
End: 2021-05-21

## 2021-05-21 NOTE — TELEPHONE ENCOUNTER
"Pt needs to reschedule July 5th follow up with labs and echo prior.    appts cancelled.    FOLLOW UP REQUEST HAS BEEN CANCELLED; can be found in the \"finalized requests\" tab of the pt's appointment desk. PLEASE REINSTATE REQUEST (right click on request and select \"reinstate\") AND LINK TO APPOINTMENT WHEN SCHEDULING.   "

## 2021-05-23 DIAGNOSIS — I10 ESSENTIAL HYPERTENSION: ICD-10-CM

## 2021-05-23 DIAGNOSIS — I35.9 AORTIC VALVE DISORDER: ICD-10-CM

## 2021-05-25 RX ORDER — AMLODIPINE BESYLATE 5 MG/1
5 TABLET ORAL DAILY
Qty: 90 TABLET | Refills: 0 | Status: SHIPPED | OUTPATIENT
Start: 2021-05-25 | End: 2021-08-17

## 2021-05-25 RX ORDER — LISINOPRIL 10 MG/1
TABLET ORAL
Qty: 180 TABLET | Refills: 0 | Status: SHIPPED | OUTPATIENT
Start: 2021-05-25 | End: 2021-08-17

## 2021-05-25 NOTE — TELEPHONE ENCOUNTER
LISINOPRIL TABS 10MG   Last Written Prescription Date:  4/13/2020  Last Fill Quantity: 180,   # refills: 3  Last Office Visit : 4/13/2020  Future Office visit:  None  Routing refill request to provider for review/approval because:  Would the Provider like updated B/P and updated Cr/K+  Labs on file for this medication?   Last done 2019??    BP Readings from Last 3 Encounters:   02/18/19 164/76   09/14/17 175/74   10/20/15 151/68     Recent Labs   Lab Test 10/08/19  1430   CR 0.87      Ok to refill medication if creatinine is low        Normal serum potassium on file in past 12 months        Recent Labs   Lab Test 10/08/19  1430   POTASSIUM 3.7               AMLODIPINE BESYLATE TABS 5MG  Last Written Prescription Date:  4/13/2020  Last Fill Quantity: 90,   # refills: 3  Last Office Visit : 4/13/2020  Future Office visit:  None  Routing refill request to provider for review/approval because:  Would the Provider like updated B/P and updated Cr/K+  Labs on file for this medication?   Last done 2019??    BP Readings from Last 3 Encounters:   02/18/19 164/76   09/14/17 175/74   10/20/15 151/68     Recent Labs   Lab Test 10/08/19  1430   CR 0.87      Ok to refill medication if creatinine is low        Normal serum potassium on file in past 12 months        Recent Labs   Lab Test 10/08/19  1430   POTASSIUM 3.7             Chikis Paniagua RN  Central Triage Red Flags/Med Refills

## 2021-08-13 DIAGNOSIS — I35.9 AORTIC VALVE DISORDER: ICD-10-CM

## 2021-08-13 DIAGNOSIS — I10 ESSENTIAL HYPERTENSION: ICD-10-CM

## 2021-08-17 RX ORDER — AMLODIPINE BESYLATE 5 MG/1
5 TABLET ORAL DAILY
Qty: 90 TABLET | Refills: 3 | Status: SHIPPED | OUTPATIENT
Start: 2021-08-17 | End: 2021-10-26

## 2021-08-17 RX ORDER — LISINOPRIL 10 MG/1
10 TABLET ORAL 2 TIMES DAILY
Qty: 180 TABLET | Refills: 3 | Status: SHIPPED | OUTPATIENT
Start: 2021-08-17 | End: 2021-10-26

## 2021-08-17 NOTE — TELEPHONE ENCOUNTER
AMLODIPINE BESYLATE TABS 5MG      Last Written Prescription Date:  5/25/21  Last Fill Quantity: 90,   # refills: 0  Last Office Visit : Masoud Molina MD  Cardiology  4/13/2020  [facility]  Recommended 1 year follow up  Future Office visit:  None scheduled  Cancelled 7/5/21 appointment    Routing refill request to provider for review/approval because:  Blood pressure outdated  BP Readings from Last 3 Encounters:   02/18/19 164/76   09/14/17 175/74   10/20/15 151/68     Overdue for creatinine  Lab Test 10/08/19  1430   CR 0.87     Nothing more recent in care everywhere nor media  Future orders in queue  Received redd refill with last fill, 30 day refill pended      LISINOPRIL TABS 10MG      Last Written Prescription Date:  5/25/221  Last Fill Quantity: 180,   # refills: 0  Last Office Visit : Masoud Molina MD  Cardiology  4/13/2020  [facility]  Recommended 1 year follow up  Future Office visit:  None scheduled  Cancelled 7/5/21 appointment    Routing refill request to provider for review/approval because:  Blood pressure outdated  BP Readings from Last 3 Encounters:   02/18/19 164/76   09/14/17 175/74   10/20/15 151/68     Overdue for creatinine  Lab Test 10/08/19  1430   CR 0.87     Nothing more recent in care everywhere nor media  Future orders in queue  Received redd refill with last fill, 30 day refill pended

## 2021-09-11 ENCOUNTER — HEALTH MAINTENANCE LETTER (OUTPATIENT)
Age: 76
End: 2021-09-11

## 2021-10-26 ENCOUNTER — TELEPHONE (OUTPATIENT)
Dept: CARDIOLOGY | Facility: CLINIC | Age: 76
End: 2021-10-26

## 2021-10-26 DIAGNOSIS — I35.9 AORTIC VALVE DISORDER: ICD-10-CM

## 2021-10-26 DIAGNOSIS — I10 ESSENTIAL HYPERTENSION: ICD-10-CM

## 2021-10-26 RX ORDER — LISINOPRIL 10 MG/1
10 TABLET ORAL 2 TIMES DAILY
Qty: 180 TABLET | Refills: 3 | Status: SHIPPED | OUTPATIENT
Start: 2021-10-26 | End: 2021-10-26

## 2021-10-26 RX ORDER — LISINOPRIL 10 MG/1
10 TABLET ORAL 2 TIMES DAILY
Qty: 60 TABLET | Refills: 0 | Status: SHIPPED | OUTPATIENT
Start: 2021-10-26 | End: 2021-12-30

## 2021-10-26 RX ORDER — AMLODIPINE BESYLATE 5 MG/1
5 TABLET ORAL DAILY
Qty: 90 TABLET | Refills: 3 | Status: SHIPPED | OUTPATIENT
Start: 2021-10-26 | End: 2021-10-26

## 2021-10-26 RX ORDER — AMLODIPINE BESYLATE 5 MG/1
5 TABLET ORAL DAILY
Qty: 30 TABLET | Refills: 0 | Status: SHIPPED | OUTPATIENT
Start: 2021-10-26 | End: 2021-12-30

## 2021-10-26 NOTE — TELEPHONE ENCOUNTER
[facility] Call Center    Phone Message    May a detailed message be left on voicemail: no     Reason for Call: Medication Question or concern regarding medication   Prescription Clarification  Name of Medication: amLODIPine (NORVASC) 5 MG tablet and lisinopril (ZESTRIL) 10 MG tablet  Prescribing Provider: Masoud Molina MD    Pharmacy:   [address] [address]    What on the order needs clarification?   Refill Request, Pt is completely out of Rx Amlodipine and has 3 days left of Rx Lisinopril.   Please send emergency 30 day supply to local [facility] (info above)   Send remaining to [facility]           Action Taken: Message routed to:  [facility] Cardiology    Travel Screening: Not Applicable

## 2021-10-28 NOTE — TELEPHONE ENCOUNTER
[facility] is [facility] as they may have already sent him his prescriptions. Will call tomorrow and see if he needs new scripts   none

## 2021-12-30 ENCOUNTER — ANCILLARY PROCEDURE (OUTPATIENT)
Dept: CARDIOLOGY | Facility: CLINIC | Age: 76
End: 2021-12-30
Payer: COMMERCIAL

## 2021-12-30 ENCOUNTER — OFFICE VISIT (OUTPATIENT)
Dept: CARDIOLOGY | Facility: CLINIC | Age: 76
End: 2021-12-30
Attending: NURSE PRACTITIONER
Payer: COMMERCIAL

## 2021-12-30 ENCOUNTER — LAB (OUTPATIENT)
Dept: LAB | Facility: CLINIC | Age: 76
End: 2021-12-30
Payer: COMMERCIAL

## 2021-12-30 VITALS
HEART RATE: 62 BPM | BODY MASS INDEX: 29.02 KG/M2 | WEIGHT: 174.2 LBS | HEIGHT: 65 IN | SYSTOLIC BLOOD PRESSURE: 174 MMHG | DIASTOLIC BLOOD PRESSURE: 67 MMHG | OXYGEN SATURATION: 96 %

## 2021-12-30 DIAGNOSIS — I10 ESSENTIAL HYPERTENSION: ICD-10-CM

## 2021-12-30 DIAGNOSIS — I35.9 AORTIC VALVE DISORDER: ICD-10-CM

## 2021-12-30 DIAGNOSIS — I10 PRIMARY HYPERTENSION: Primary | ICD-10-CM

## 2021-12-30 LAB
ANION GAP SERPL CALCULATED.3IONS-SCNC: 7 MMOL/L (ref 3–14)
BUN SERPL-MCNC: 19 MG/DL (ref 7–30)
CALCIUM SERPL-MCNC: 8.6 MG/DL (ref 8.5–10.1)
CHLORIDE BLD-SCNC: 108 MMOL/L (ref 94–109)
CHOLEST SERPL-MCNC: 204 MG/DL
CO2 SERPL-SCNC: 27 MMOL/L (ref 20–32)
CREAT SERPL-MCNC: 0.92 MG/DL (ref 0.66–1.25)
ERYTHROCYTE [DISTWIDTH] IN BLOOD BY AUTOMATED COUNT: 13.6 % (ref 10–15)
FASTING STATUS PATIENT QL REPORTED: YES
GFR SERPL CREATININE-BSD FRML MDRD: 86 ML/MIN/1.73M2
GLUCOSE BLD-MCNC: 102 MG/DL (ref 70–99)
HCT VFR BLD AUTO: 46.2 % (ref 40–53)
HDLC SERPL-MCNC: 52 MG/DL
HGB BLD-MCNC: 15.1 G/DL (ref 13.3–17.7)
LDLC SERPL CALC-MCNC: 138 MG/DL
LVEF ECHO: NORMAL
MCH RBC QN AUTO: 28.8 PG (ref 26.5–33)
MCHC RBC AUTO-ENTMCNC: 32.7 G/DL (ref 31.5–36.5)
MCV RBC AUTO: 88 FL (ref 78–100)
NONHDLC SERPL-MCNC: 152 MG/DL
PLATELET # BLD AUTO: 231 10E3/UL (ref 150–450)
POTASSIUM BLD-SCNC: 3.6 MMOL/L (ref 3.4–5.3)
RBC # BLD AUTO: 5.25 10E6/UL (ref 4.4–5.9)
SODIUM SERPL-SCNC: 142 MMOL/L (ref 133–144)
TRIGL SERPL-MCNC: 71 MG/DL
WBC # BLD AUTO: 5.4 10E3/UL (ref 4–11)

## 2021-12-30 PROCEDURE — G0463 HOSPITAL OUTPT CLINIC VISIT: HCPCS

## 2021-12-30 PROCEDURE — 93306 TTE W/DOPPLER COMPLETE: CPT | Performed by: INTERNAL MEDICINE

## 2021-12-30 PROCEDURE — 99213 OFFICE O/P EST LOW 20 MIN: CPT | Mod: 25 | Performed by: NURSE PRACTITIONER

## 2021-12-30 PROCEDURE — 85027 COMPLETE CBC AUTOMATED: CPT | Performed by: PATHOLOGY

## 2021-12-30 PROCEDURE — 36415 COLL VENOUS BLD VENIPUNCTURE: CPT | Performed by: PATHOLOGY

## 2021-12-30 PROCEDURE — 80048 BASIC METABOLIC PNL TOTAL CA: CPT | Performed by: PATHOLOGY

## 2021-12-30 PROCEDURE — 80061 LIPID PANEL: CPT | Performed by: PATHOLOGY

## 2021-12-30 RX ORDER — LISINOPRIL 20 MG/1
20 TABLET ORAL DAILY
Qty: 90 TABLET | Refills: 3 | Status: SHIPPED | OUTPATIENT
Start: 2021-12-30 | End: 2022-12-20

## 2021-12-30 RX ORDER — AMLODIPINE BESYLATE 5 MG/1
5 TABLET ORAL DAILY
Qty: 90 TABLET | Refills: 3 | Status: SHIPPED | OUTPATIENT
Start: 2021-12-30 | End: 2022-12-20

## 2021-12-30 RX ORDER — LISINOPRIL 20 MG/1
20 TABLET ORAL 2 TIMES DAILY
Qty: 90 TABLET | Refills: 3 | Status: SHIPPED | OUTPATIENT
Start: 2021-12-30 | End: 2021-12-30

## 2021-12-30 ASSESSMENT — PAIN SCALES - GENERAL: PAINLEVEL: NO PAIN (0)

## 2021-12-30 ASSESSMENT — MIFFLIN-ST. JEOR: SCORE: 1442.05

## 2021-12-30 NOTE — PROGRESS NOTES
"      Cardiology Clinic Note      HPI: Victor Manuel Cook is a 76 year old gentleman w/ h/o aortic insufficiency presenting to clinic for routine follow-up. He was last seen by Dr. Molina 2/2019 at which time he was feeling well.     Since his last visit, he remains asymptomatic. His BP is elevated and has been elevated at home. He takes his anti-HTN at separate times and takes Lisinopril twice daily. He feels well, would like to be more active, is working on increasing activity and diet. No light headedness, chest pain, SOB, CHF symptoms.  No other concerns voiced.     Current Outpatient Medications   Medication Sig Dispense Refill     amLODIPine (NORVASC) 5 MG tablet Take 1 tablet (5 mg) by mouth daily For additional refills, please schedule a follow-up appointment at [phone] 30 tablet 0     aspirin (ASA) 81 MG tablet Take 1 tablet (81 mg) by mouth daily 90 tablet 3     lisinopril (ZESTRIL) 10 MG tablet Take 1 tablet (10 mg) by mouth 2 times daily For additional refills, please schedule a follow-up appointment at [phone] 60 tablet 0       Past Medical History:   Diagnosis Date     Aortic regurgitation      HTN (hypertension)      Wt Readings from Last 3 Encounters:   12/30/21 79 kg (174 lb 3.2 oz)   02/18/19 86.8 kg (191 lb 4.8 oz)   09/14/17 87.2 kg (192 lb 4.8 oz)       No past surgical history on file.    No family history on file.    Social History     Tobacco Use     Smoking status: Former Smoker     Types: Cigarettes     Smokeless tobacco: Never Used     Tobacco comment: Smoked rarely when he was teenager and in college.    Substance Use Topics     Alcohol use: No       No Known Allergies      ROS:   Negative besides that noted in HPI    Physical Examination:  Vitals: BP (!) 174/67 (BP Location: Right arm, Patient Position: Chair, Cuff Size: Adult Regular)   Pulse 62   Ht 1.651 m (5' 5\")   Wt 79 kg (174 lb 3.2 oz)   SpO2 96%   BMI 28.99 kg/m    BMI= Body mass index is 28.99 kg/m .    GENERAL " APPEARANCE: healthy, alert and no distress  HEENT: no icterus  NECK: JVP is not visible  CHEST: lungs clear to auscultation - no rales, rhonchi or wheezes  CARDIOVASCULAR: regular rhythm, normal S1, S2,   EXTREMITIES: no clubbing, cyanosis or edema  NEURO: alert and oriented to person/place/time, normal speech  VASC: Peripheral pulses are normal in volumes and symmetric bilaterally.   SKIN: no ecchymoses, no rashes    Echo   Poor acoustic windows.  Global and regional left ventricular function is normal with an EF of 60-65%.  Right ventricular function, chamber size, wall motion, and thickness are  normal.  Mild to moderate aortic insufficiency is present.  The inferior vena cava is normal.  No pericardial effusion is present.  There has been no change.  Victor Manuel Cook is a 73 year old gentleman w/ h/o aortic insufficiency presenting to clinic for routine follow-up.  His TTE demonstrates stable AI.  His BP is elevated today.     Assessment:  # Aortic Insufficiency: Mild-to-moderate. Stable by TTE.  Asymptomatic.  --Continue to monitor by yearly TTE     # HTN:   --Continue amlodipine 5mg Qday  -- Increase Lisinopril 20 mg daily     Recommendations:  # Plan to increase Lisinopril to 40 mg daily, however, patient would first like to try taking home meds properly at 20 mg daily. He will contact me and let me know if BP remains elevated and if so, I will plan to increase Lisinopril to 40 mg daily. If increased, will plan for BMP 2 weeks thereafter.  # Keep up the good work with excellent risk factor modification  # Follow up one year with echo prior    JEANIE Mercer, CNP  [facility]  [phone]

## 2021-12-30 NOTE — LETTER
12/30/2021      RE: Victor Manuel Cook  [address]  [facility] [address]       Dear Colleague,    Thank you for the opportunity to participate in the care of your patient, Victor Manuel Cook, at the [facility] HEART CLINIC [place] at [facility]. Please see a copy of my visit note below.          Cardiology Clinic Note      HPI: Victor Manuel Cook is a 76 year old gentleman w/ h/o aortic insufficiency presenting to clinic for routine follow-up. He was last seen by Dr. Molina 2/2019 at which time he was feeling well.     Since his last visit, he remains asymptomatic. His BP is elevated and has been elevated at home. He takes his anti-HTN at separate times and takes Lisinopril twice daily. He feels well, would like to be more active, is working on increasing activity and diet. No light headedness, chest pain, SOB, CHF symptoms.  No other concerns voiced.     Current Outpatient Medications   Medication Sig Dispense Refill     amLODIPine (NORVASC) 5 MG tablet Take 1 tablet (5 mg) by mouth daily For additional refills, please schedule a follow-up appointment at [phone] 30 tablet 0     aspirin (ASA) 81 MG tablet Take 1 tablet (81 mg) by mouth daily 90 tablet 3     lisinopril (ZESTRIL) 10 MG tablet Take 1 tablet (10 mg) by mouth 2 times daily For additional refills, please schedule a follow-up appointment at [phone] 60 tablet 0       Past Medical History:   Diagnosis Date     Aortic regurgitation      HTN (hypertension)      Wt Readings from Last 3 Encounters:   12/30/21 79 kg (174 lb 3.2 oz)   02/18/19 86.8 kg (191 lb 4.8 oz)   09/14/17 87.2 kg (192 lb 4.8 oz)       No past surgical history on file.    No family history on file.    Social History     Tobacco Use     Smoking status: Former Smoker     Types: Cigarettes     Smokeless tobacco: Never Used     Tobacco comment: Smoked rarely when he was teenager and in college.    Substance Use Topics      "Alcohol use: No       No Known Allergies      ROS:   Negative besides that noted in HPI    Physical Examination:  Vitals: BP (!) 174/67 (BP Location: Right arm, Patient Position: Chair, Cuff Size: Adult Regular)   Pulse 62   Ht 1.651 m (5' 5\")   Wt 79 kg (174 lb 3.2 oz)   SpO2 96%   BMI 28.99 kg/m    BMI= Body mass index is 28.99 kg/m .    GENERAL APPEARANCE: healthy, alert and no distress  HEENT: no icterus  NECK: JVP is not visible  CHEST: lungs clear to auscultation - no rales, rhonchi or wheezes  CARDIOVASCULAR: regular rhythm, normal S1, S2,   EXTREMITIES: no clubbing, cyanosis or edema  NEURO: alert and oriented to person/place/time, normal speech  VASC: Peripheral pulses are normal in volumes and symmetric bilaterally.   SKIN: no ecchymoses, no rashes    Echo   Poor acoustic windows.  Global and regional left ventricular function is normal with an EF of 60-65%.  Right ventricular function, chamber size, wall motion, and thickness are  normal.  Mild to moderate aortic insufficiency is present.  The inferior vena cava is normal.  No pericardial effusion is present.  There has been no change.  Victor Manuel Cook is a 73 year old gentleman w/ h/o aortic insufficiency presenting to clinic for routine follow-up.  His TTE demonstrates stable AI.  His BP is elevated today.     Assessment:  # Aortic Insufficiency: Mild-to-moderate. Stable by TTE.  Asymptomatic.  --Continue to monitor by yearly TTE     # HTN:   --Continue amlodipine 5mg Qday  -- Increase Lisinopril 20 mg daily     Recommendations:  # Plan to increase Lisinopril to 40 mg daily, however, patient would first like to try taking home meds properly at 20 mg daily. He will contact me and let me know if BP remains elevated and if so, I will plan to increase Lisinopril to 40 mg daily. If increased, will plan for BMP 2 weeks thereafter.  # Keep up the good work with excellent risk factor modification  # Follow up one year with echo prior    Zuly Moseley, " APRN, CNP  [facility]  [phone]

## 2021-12-30 NOTE — NURSING NOTE
Chief Complaint   Patient presents with     Follow Up     HTN and aortic regurgitation     Vitals were taken and medications were reconciled.   Merrill Lawson, EMT  11:46 AM

## 2022-04-23 ENCOUNTER — HEALTH MAINTENANCE LETTER (OUTPATIENT)
Age: 77
End: 2022-04-23

## 2022-10-30 ENCOUNTER — HEALTH MAINTENANCE LETTER (OUTPATIENT)
Age: 77
End: 2022-10-30

## 2022-12-20 ENCOUNTER — TELEPHONE (OUTPATIENT)
Dept: CARDIOLOGY | Facility: CLINIC | Age: 77
End: 2022-12-20

## 2022-12-20 DIAGNOSIS — I10 ESSENTIAL HYPERTENSION: ICD-10-CM

## 2022-12-20 DIAGNOSIS — I10 PRIMARY HYPERTENSION: ICD-10-CM

## 2022-12-20 RX ORDER — LISINOPRIL 20 MG/1
20 TABLET ORAL DAILY
Qty: 90 TABLET | Refills: 1 | Status: SHIPPED | OUTPATIENT
Start: 2022-12-20 | End: 2023-03-29

## 2022-12-20 RX ORDER — AMLODIPINE BESYLATE 5 MG/1
5 TABLET ORAL DAILY
Qty: 90 TABLET | Refills: 1 | Status: SHIPPED | OUTPATIENT
Start: 2022-12-20 | End: 2023-03-29

## 2022-12-20 NOTE — TELEPHONE ENCOUNTER
[facility] Call Center    Phone Message    May a detailed message be left on voicemail: no     Reason for Call: Other: Victor Manuel called to advise he would like to schedule a F/U with Dr. Molina, however, he will require an echocardiogram and labs prior to his appointment. Please add orders and reach out to Victor Manuel when complete.      Action Taken: Message routed to:  [facility] Cardiology    Travel Screening: Not Applicable

## 2023-03-27 DIAGNOSIS — I10 PRIMARY HYPERTENSION: ICD-10-CM

## 2023-03-28 DIAGNOSIS — I10 ESSENTIAL HYPERTENSION: ICD-10-CM

## 2023-03-29 ENCOUNTER — TELEPHONE (OUTPATIENT)
Dept: CARDIOLOGY | Facility: CLINIC | Age: 78
End: 2023-03-29
Payer: COMMERCIAL

## 2023-03-29 DIAGNOSIS — I10 PRIMARY HYPERTENSION: ICD-10-CM

## 2023-03-29 DIAGNOSIS — I10 ESSENTIAL HYPERTENSION: ICD-10-CM

## 2023-03-29 RX ORDER — AMLODIPINE BESYLATE 5 MG/1
TABLET ORAL
Qty: 90 TABLET | Refills: 1 | OUTPATIENT
Start: 2023-03-29

## 2023-03-29 RX ORDER — AMLODIPINE BESYLATE 5 MG/1
5 TABLET ORAL DAILY
Qty: 90 TABLET | Refills: 3 | Status: SHIPPED | OUTPATIENT
Start: 2023-03-29 | End: 2024-05-13

## 2023-03-29 RX ORDER — LISINOPRIL 20 MG/1
TABLET ORAL
Qty: 90 TABLET | Refills: 1 | OUTPATIENT
Start: 2023-03-29

## 2023-03-29 RX ORDER — LISINOPRIL 20 MG/1
20 TABLET ORAL DAILY
Qty: 90 TABLET | Refills: 3 | Status: SHIPPED | OUTPATIENT
Start: 2023-03-29 | End: 2024-03-22

## 2023-03-29 NOTE — TELEPHONE ENCOUNTER
[facility] Call Center    Phone Message    May a detailed message be left on voicemail: yes     Reason for Call: Medication Refill Request    Has the patient contacted the pharmacy for the refill? Yes   Name of medication being requested: lisinopril (ZESTRIL) 20 MG tablet  Provider who prescribed the medication: Dr. Molina  Pharmacy: [id number] [place], [place] - [address] AT [address]  Date medication is needed: 3/30/23   Pt states pharmacy needs a 90 supply approval for medication to be filled. Pt can be reached at [phone] for any questions.      Action Taken: Other: Cardiology    Travel Screening: Not Applicable     Thank you!  [facility]

## 2023-04-07 ENCOUNTER — ANCILLARY PROCEDURE (OUTPATIENT)
Dept: CARDIOLOGY | Facility: CLINIC | Age: 78
End: 2023-04-07
Attending: INTERNAL MEDICINE
Payer: COMMERCIAL

## 2023-04-07 ENCOUNTER — LAB (OUTPATIENT)
Dept: LAB | Facility: CLINIC | Age: 78
End: 2023-04-07
Payer: COMMERCIAL

## 2023-04-07 DIAGNOSIS — Z91.89 CARDIOVASCULAR RISK FACTOR: ICD-10-CM

## 2023-04-07 DIAGNOSIS — I35.9 AORTIC VALVE DISORDER: ICD-10-CM

## 2023-04-07 DIAGNOSIS — I10 ESSENTIAL HYPERTENSION: ICD-10-CM

## 2023-04-07 DIAGNOSIS — I10 PRIMARY HYPERTENSION: ICD-10-CM

## 2023-04-07 LAB
ANION GAP SERPL CALCULATED.3IONS-SCNC: 11 MMOL/L (ref 7–15)
BUN SERPL-MCNC: 26.1 MG/DL (ref 8–23)
CALCIUM SERPL-MCNC: 8.7 MG/DL (ref 8.8–10.2)
CHLORIDE SERPL-SCNC: 107 MMOL/L (ref 98–107)
CHOLEST SERPL-MCNC: 221 MG/DL
CREAT SERPL-MCNC: 1.08 MG/DL (ref 0.67–1.17)
DEPRECATED HCO3 PLAS-SCNC: 22 MMOL/L (ref 22–29)
ERYTHROCYTE [DISTWIDTH] IN BLOOD BY AUTOMATED COUNT: 13.5 % (ref 10–15)
GFR SERPL CREATININE-BSD FRML MDRD: 70 ML/MIN/1.73M2
GLUCOSE SERPL-MCNC: 117 MG/DL (ref 70–99)
HCT VFR BLD AUTO: 44.7 % (ref 40–53)
HDLC SERPL-MCNC: 52 MG/DL
HGB BLD-MCNC: 15.2 G/DL (ref 13.3–17.7)
LDLC SERPL CALC-MCNC: 150 MG/DL
LVEF ECHO: NORMAL
MCH RBC QN AUTO: 29.3 PG (ref 26.5–33)
MCHC RBC AUTO-ENTMCNC: 34 G/DL (ref 31.5–36.5)
MCV RBC AUTO: 86 FL (ref 78–100)
NONHDLC SERPL-MCNC: 169 MG/DL
PLATELET # BLD AUTO: 250 10E3/UL (ref 150–450)
POTASSIUM SERPL-SCNC: 4 MMOL/L (ref 3.4–5.3)
RBC # BLD AUTO: 5.18 10E6/UL (ref 4.4–5.9)
SODIUM SERPL-SCNC: 140 MMOL/L (ref 136–145)
TRIGL SERPL-MCNC: 97 MG/DL
WBC # BLD AUTO: 5.6 10E3/UL (ref 4–11)

## 2023-04-07 PROCEDURE — 80048 BASIC METABOLIC PNL TOTAL CA: CPT | Performed by: PATHOLOGY

## 2023-04-07 PROCEDURE — 36415 COLL VENOUS BLD VENIPUNCTURE: CPT | Performed by: PATHOLOGY

## 2023-04-07 PROCEDURE — 80061 LIPID PANEL: CPT | Performed by: PATHOLOGY

## 2023-04-07 PROCEDURE — 93306 TTE W/DOPPLER COMPLETE: CPT | Performed by: INTERNAL MEDICINE

## 2023-04-07 PROCEDURE — 85027 COMPLETE CBC AUTOMATED: CPT | Performed by: PATHOLOGY

## 2023-04-10 ENCOUNTER — OFFICE VISIT (OUTPATIENT)
Dept: CARDIOLOGY | Facility: CLINIC | Age: 78
End: 2023-04-10
Attending: INTERNAL MEDICINE
Payer: COMMERCIAL

## 2023-04-10 VITALS — HEART RATE: 62 BPM | OXYGEN SATURATION: 97 % | DIASTOLIC BLOOD PRESSURE: 71 MMHG | SYSTOLIC BLOOD PRESSURE: 158 MMHG

## 2023-04-10 DIAGNOSIS — I35.9 AORTIC VALVE DISORDER: Primary | ICD-10-CM

## 2023-04-10 DIAGNOSIS — I10 PRIMARY HYPERTENSION: ICD-10-CM

## 2023-04-10 PROCEDURE — 99215 OFFICE O/P EST HI 40 MIN: CPT | Performed by: INTERNAL MEDICINE

## 2023-04-10 PROCEDURE — G0463 HOSPITAL OUTPT CLINIC VISIT: HCPCS | Performed by: INTERNAL MEDICINE

## 2023-04-10 NOTE — PATIENT INSTRUCTIONS
Patient Instructions:  It was a pleasure to see you in the cardiology clinic today.      If you have any questions, call  Katheryn Flores RN, at [phone].   [facility] Cardiology Clinics.  To schedule an appointment or to leave a message for your Care Team Press #1  If you are a physician calling for another physician Press #2  For Billing Press #3  For Medical Records Press #4  We are encouraging the use of H5 to communicate with your HealthCare Provider    Note the new medications: none  Stop the following medications: none    I'll call you in July for blood pressures    The results from today include: none  Please follow up with Dr. Molina in one year with labs and ECHO    If you have an urgent need after hours (8:00 am to 4:30 pm) please call [phone] and ask for the cardiology fellow on call.

## 2023-04-10 NOTE — LETTER
4/10/2023      RE: Victor Manuel Cook  [address]  [facility] [address]       Dear Colleague,    Thank you for the opportunity to participate in the care of your patient, Victor Manuel Cook, at the [facility] HEART CLINIC [place] at [facility]. Please see a copy of my visit note below.    Victor Manuel Cook's goals for this visit include:     He requests these members of his care team be copied on today's visit information: PCP    PCP: Masoud Molina    Referring Provider:  No referring provider defined for this encounter.    BP (!) 183/69 (BP Location: Right arm, Patient Position: Sitting, Cuff Size: Adult Regular)   Pulse 62   SpO2 97%     Do you need any medication refills at today's visit? No.    Akshat Jarquin, EMT  Clinic Support  [facility]    [phone]    Employed by [facility] [employer]          [facility]  CARDIOVASCULAR MEDICINE CLINIC NOTE    Referring Provider: No ref. provider found   Primary Care Provider: Masoud Molina     Patient Name: Victor Manuel Cook   MRN: [id number]     PERTINENT CLINICAL HISTORY:   Victor Manuel Cook is a 77 year old male w/ aortic insufficiency presenting for clinic f/u.  He is asymptomatic and able to exert himself without issue though limited by knee and lower back pain.  Denies any chest pain, palpitations, SOB, edema, orthopnea, PND, syncope or near syncope.     PAST MEDICAL HISTORY:     Past Medical History:   Diagnosis Date     Aortic regurgitation      HTN (hypertension)         PAST SURGICAL HISTORY:   No past surgical history on file.     CURRENT MEDICATIONS:     Current Outpatient Medications   Medication Sig Dispense Refill     amLODIPine (NORVASC) 5 MG tablet Take 1 tablet (5 mg) by mouth daily 90 tablet 3     aspirin (ASA) 81 MG tablet Take 1 tablet (81 mg) by mouth daily 90 tablet 3     lisinopril (ZESTRIL) 20 MG tablet Take 1 tablet (20 mg) by  mouth daily 90 tablet 3        ALLERGIES:   No Known Allergies      PHYSICAL EXAMINATION:   BP (!) 158/71 (BP Location: Left arm, Patient Position: Sitting, Cuff Size: Adult Regular)   Pulse 62   SpO2 97%   There is no height or weight on file to calculate BMI.  Wt Readings from Last 2 Encounters:   12/30/21 79 kg (174 lb 3.2 oz)   02/18/19 86.8 kg (191 lb 4.8 oz)     Constitutional: no acute distress, pleasant and cooperative, appears overall well.  Cardiovascular: RRR nl S1S2, JVP not elevated, extremities with no edema or cyanosis  Respiratory: clear to auscultation and percussion bilaterally anterior and posterior  Gastrointestinal: soft, nontender, non distended, no hepatosplenomegaly or masses  Neurologic: AOx3     LABORATORY DATA:   I have reviewed the labs below.    LIPID RESULTS:  Recent Labs   Lab Test 04/07/23  1058 12/30/21  1035   CHOL 221* 204*   HDL 52 52   * 138*   TRIG 97 71        CBC RESULTS:  Recent Labs   Lab Test 04/07/23  1058 12/30/21  1035   WBC 5.6 5.4   HGB 15.2 15.1   HCT 44.7 46.2    231       BMP RESULTS:  Recent Labs   Lab Test 04/07/23  1058 12/30/21  1035    142   POTASSIUM 4.0 3.6   CHLORIDE 107 108   CO2 22 27   ANIONGAP 11 7   * 102*   BUN 26.1* 19   CR 1.08 0.92   SOCORRO 8.7* 8.6        PROCEDURES & FURTHER ASSESSMENTS:   I have reviewed the test results below.    ECHO: 2/18/2019  Poor acoustic windows.  Global and regional left ventricular function is normal with an EF of 60-65%.  Right ventricular function, chamber size, wall motion, and thickness are  normal.  Mild to moderate aortic insufficiency is present.  The inferior vena cava is normal.  No pericardial effusion is present.  There has been no change.    ECHO: 4/7/2023  Global and regional left ventricular function is normal with an EF of 60-65%.  Global right ventricular function is normal.  Mild to moderate aortic insufficiency is present.  Mild aortic valve calcification is present.  No aortic  valve stenosis.     This study was compared with the study from 12/2021 .  No significant changes noted.  .     CLINICAL IMPRESSION:   Victor Manuel Cook is a 77 year old gentleman w/ h/o aortic insufficiency presenting to clinic for routine follow-up.  He was not able to get his TTE.  He will have his TTE when we have slots available.     Aortic Insufficiency: Mild-to-moderate.  Stable by TTE in 2023.  Asymptomatic.  --Repeat TTE in 1 yr  --Afterload reduction as below     HTN:   --Continue amlodipine 5mg Qday  --Increase lisinopril to 20mg BID  --Continue to monitor at home  --Continue ASA 81mg Qday  --Will call or write with BP measurements in 3 months to evaluate for medication needs.    Hyperlipidemia: will attempt weight loss; would like to try lifestyle modification first     Follow-up: 1 year with TTE    Thank you for allowing us to take part in the care of this very pleasant patient.  Please do not hesitate to call if any further questions or concerns arise.    I spent 40 min today reviewing the medical record, meeting with the patient, and completing this note.    Masoud Molina MD, PhD  Interventional/Critical Care Cardiology  [phone]    April 10, 2023      CC  Patient Care Team:  Masoud Molina MD as PCP - General (Interventional Cardiology)  Anastasia Moseley APRN CNP as Nurse Practitioner (Interventional Cardiology)  Anastasia Moseley APRN CNP as Assigned Heart and Vascular Provider        Please do not hesitate to contact me if you have any questions/concerns.     Sincerely,     Masoud Molina MD

## 2023-04-10 NOTE — PROGRESS NOTES
[facility]  CARDIOVASCULAR MEDICINE CLINIC NOTE    Referring Provider: No ref. provider found   Primary Care Provider: Masoud Molina     Patient Name: Victor Manuel Cook   MRN: [id number]     PERTINENT CLINICAL HISTORY:   Victor Manuel Cook is a 77 year old male w/ aortic insufficiency presenting for clinic f/u.  He is asymptomatic and able to exert himself without issue though limited by knee and lower back pain.  Denies any chest pain, palpitations, SOB, edema, orthopnea, PND, syncope or near syncope.     PAST MEDICAL HISTORY:     Past Medical History:   Diagnosis Date     Aortic regurgitation      HTN (hypertension)         PAST SURGICAL HISTORY:   No past surgical history on file.     CURRENT MEDICATIONS:     Current Outpatient Medications   Medication Sig Dispense Refill     amLODIPine (NORVASC) 5 MG tablet Take 1 tablet (5 mg) by mouth daily 90 tablet 3     aspirin (ASA) 81 MG tablet Take 1 tablet (81 mg) by mouth daily 90 tablet 3     lisinopril (ZESTRIL) 20 MG tablet Take 1 tablet (20 mg) by mouth daily 90 tablet 3        ALLERGIES:   No Known Allergies      PHYSICAL EXAMINATION:   BP (!) 158/71 (BP Location: Left arm, Patient Position: Sitting, Cuff Size: Adult Regular)   Pulse 62   SpO2 97%   There is no height or weight on file to calculate BMI.  Wt Readings from Last 2 Encounters:   12/30/21 79 kg (174 lb 3.2 oz)   02/18/19 86.8 kg (191 lb 4.8 oz)     Constitutional: no acute distress, pleasant and cooperative, appears overall well.  Cardiovascular: RRR nl S1S2, JVP not elevated, extremities with no edema or cyanosis  Respiratory: clear to auscultation and percussion bilaterally anterior and posterior  Gastrointestinal: soft, nontender, non distended, no hepatosplenomegaly or masses  Neurologic: AOx3     LABORATORY DATA:   I have reviewed the labs below.    LIPID RESULTS:  Recent Labs   Lab Test 04/07/23  1058 12/30/21  1035   CHOL 221* 204*   HDL 52 52   * 138*   TRIG 97 71         CBC RESULTS:  Recent Labs   Lab Test 04/07/23  1058 12/30/21  1035   WBC 5.6 5.4   HGB 15.2 15.1   HCT 44.7 46.2    231       BMP RESULTS:  Recent Labs   Lab Test 04/07/23  1058 12/30/21  1035    142   POTASSIUM 4.0 3.6   CHLORIDE 107 108   CO2 22 27   ANIONGAP 11 7   * 102*   BUN 26.1* 19   CR 1.08 0.92   SOCORRO 8.7* 8.6        PROCEDURES & FURTHER ASSESSMENTS:   I have reviewed the test results below.    ECHO: 2/18/2019  Poor acoustic windows.  Global and regional left ventricular function is normal with an EF of 60-65%.  Right ventricular function, chamber size, wall motion, and thickness are  normal.  Mild to moderate aortic insufficiency is present.  The inferior vena cava is normal.  No pericardial effusion is present.  There has been no change.    ECHO: 4/7/2023  Global and regional left ventricular function is normal with an EF of 60-65%.  Global right ventricular function is normal.  Mild to moderate aortic insufficiency is present.  Mild aortic valve calcification is present.  No aortic valve stenosis.     This study was compared with the study from 12/2021 .  No significant changes noted.  .     CLINICAL IMPRESSION:   Victor Manuel Cook is a 77 year old gentleman w/ h/o aortic insufficiency presenting to clinic for routine follow-up.  He was not able to get his TTE.  He will have his TTE when we have slots available.     Aortic Insufficiency: Mild-to-moderate.  Stable by TTE in 2023.  Asymptomatic.  --Repeat TTE in 1 yr  --Afterload reduction as below     HTN:   --Continue amlodipine 5mg Qday  --Increase lisinopril to 20mg BID  --Continue to monitor at home  --Continue ASA 81mg Qday  --Will call or write with BP measurements in 3 months to evaluate for medication needs.    Hyperlipidemia: will attempt weight loss; would like to try lifestyle modification first     Follow-up: 1 year with TTE    Thank you for allowing us to take part in the care of this very pleasant patient.  Please do  not hesitate to call if any further questions or concerns arise.    I spent 40 min today reviewing the medical record, meeting with the patient, and completing this note.    Masoud Molina MD, PhD  Interventional/Critical Care Cardiology  [phone]    April 10, 2023        Patient Care Team:  Masoud Molina MD as PCP - General (Interventional Cardiology)  Anastasia Moseley APRN CNP as Nurse Practitioner (Interventional Cardiology)  Anastasia Moseley APRN CNP as Assigned Heart and Vascular Provider

## 2023-04-10 NOTE — PROGRESS NOTES
Victor Manuel Cook's goals for this visit include:     He requests these members of his care team be copied on today's visit information: PCP    PCP: Masoud Molina    Referring Provider:  No referring provider defined for this encounter.    BP (!) 183/69 (BP Location: Right arm, Patient Position: Sitting, Cuff Size: Adult Regular)   Pulse 62   SpO2 97%     Do you need any medication refills at today's visit? No.    Akshat Jarquin, EMT  Clinic Support  [facility]    [phone]    Employed by [facility] [employer]

## 2023-06-01 ENCOUNTER — HEALTH MAINTENANCE LETTER (OUTPATIENT)
Age: 78
End: 2023-06-01

## 2023-07-25 ENCOUNTER — TELEPHONE (OUTPATIENT)
Dept: CARDIOLOGY | Facility: CLINIC | Age: 78
End: 2023-07-25
Payer: COMMERCIAL

## 2023-07-25 NOTE — TELEPHONE ENCOUNTER
[facility] Call Center    Phone Message    May a detailed message be left on voicemail: yes     Reason for Call: Other: pt wants to get a handicapped sticker/sign for his car.  How can he go about doing that?   Please call pt to advise what he needs to do and if Dr. Molina or Anna Perdomo can assist him    Action Taken: Message routed to:  [facility] cardio    Travel Screening: Not Applicable    Thank you!  [facility]

## 2023-07-26 NOTE — TELEPHONE ENCOUNTER
Patient asking for a disability parking waiver. His wife had one for her COPD but she recently passed away. He is asking if he could get one because of his heart condition. Unfortunately his heart condition does not warrant a disability waiver. Directed him to contact his primary care physician since he does state that his knees are a problem.

## 2023-07-26 NOTE — TELEPHONE ENCOUNTER
[facility] Call Center    Phone Message    May a detailed message be left on voicemail: yes     Reason for Call: Other: Please call pt back. Returning call to Katheryn Flores.      Action Taken: Message routed to:  Other: Cardiology    Travel Screening: Not Applicable      Thank you!  [facility]

## 2023-12-29 ENCOUNTER — MYC MEDICAL ADVICE (OUTPATIENT)
Dept: CARDIOLOGY | Facility: CLINIC | Age: 78
End: 2023-12-29
Payer: COMMERCIAL

## 2023-12-29 ENCOUNTER — TELEPHONE (OUTPATIENT)
Dept: CARDIOLOGY | Facility: CLINIC | Age: 78
End: 2023-12-29
Payer: COMMERCIAL

## 2024-03-22 DIAGNOSIS — I10 PRIMARY HYPERTENSION: ICD-10-CM

## 2024-03-22 RX ORDER — LISINOPRIL 20 MG/1
20 TABLET ORAL DAILY
Qty: 90 TABLET | Refills: 0 | Status: SHIPPED | OUTPATIENT
Start: 2024-03-22 | End: 2024-06-20

## 2024-03-22 NOTE — TELEPHONE ENCOUNTER
[facility] Call Center    Phone Message    May a detailed message be left on voicemail: yes     Reason for Call: Medication Refill Request    Has the patient contacted the pharmacy for the refill? Yes   Name of medication being requested: lisinopril (ZESTRIL) 20 MG tablet   Provider who prescribed the medication: Dr. Molina  Pharmacy: [facility] #[id number] [place], [place] - [address] AT [address]    NOTE:  pt has an appt w/Dr. Molina 5.16.24.    Date medication is needed: ASAP       Action Taken: Message routed to:  [facility] cardio    Travel Screening: Not Applicable    Thank you!  [facility]

## 2024-03-22 NOTE — TELEPHONE ENCOUNTER
lisinopril (ZESTRIL) 20 MG tablet      Last Written Prescription Date:  3/29/23  Last Fill Quantity: 90,   # refills: 3  Last Office Visit : 4/10/23  Future Office visit:  One year> 5/13/24      Can refill x 3 months if BP greater than or equal to 140/90, and refer to PCP for follow up.  04/10/23 (!) 158/71   12/30/21 (!) 174/67   02/18/19 164/76   Eli refill provided

## 2024-05-13 ENCOUNTER — OFFICE VISIT (OUTPATIENT)
Dept: CARDIOLOGY | Facility: CLINIC | Age: 79
End: 2024-05-13
Attending: INTERNAL MEDICINE
Payer: COMMERCIAL

## 2024-05-13 ENCOUNTER — LAB (OUTPATIENT)
Dept: LAB | Facility: CLINIC | Age: 79
End: 2024-05-13
Payer: COMMERCIAL

## 2024-05-13 ENCOUNTER — HOSPITAL ENCOUNTER (OUTPATIENT)
Dept: CARDIOLOGY | Facility: CLINIC | Age: 79
Discharge: HOME | End: 2024-05-13
Attending: INTERNAL MEDICINE
Payer: COMMERCIAL

## 2024-05-13 VITALS
HEART RATE: 68 BPM | SYSTOLIC BLOOD PRESSURE: 146 MMHG | DIASTOLIC BLOOD PRESSURE: 73 MMHG | BODY MASS INDEX: 29.52 KG/M2 | OXYGEN SATURATION: 96 % | WEIGHT: 177.4 LBS

## 2024-05-13 DIAGNOSIS — I35.9 AORTIC VALVE DISORDER: ICD-10-CM

## 2024-05-13 DIAGNOSIS — I10 PRIMARY HYPERTENSION: ICD-10-CM

## 2024-05-13 DIAGNOSIS — I35.9 AORTIC VALVE DISORDER: Primary | ICD-10-CM

## 2024-05-13 DIAGNOSIS — I10 ESSENTIAL HYPERTENSION: ICD-10-CM

## 2024-05-13 LAB
ANION GAP SERPL CALCULATED.3IONS-SCNC: 11 MMOL/L (ref 7–15)
BUN SERPL-MCNC: 20.2 MG/DL (ref 8–23)
CALCIUM SERPL-MCNC: 8.6 MG/DL (ref 8.8–10.2)
CHLORIDE SERPL-SCNC: 107 MMOL/L (ref 98–107)
CHOLEST SERPL-MCNC: 186 MG/DL
CREAT SERPL-MCNC: 1.17 MG/DL (ref 0.67–1.17)
DEPRECATED HCO3 PLAS-SCNC: 21 MMOL/L (ref 22–29)
EGFRCR SERPLBLD CKD-EPI 2021: 63 ML/MIN/1.73M2
ERYTHROCYTE [DISTWIDTH] IN BLOOD BY AUTOMATED COUNT: 13.5 % (ref 10–15)
FASTING STATUS PATIENT QL REPORTED: YES
FASTING STATUS PATIENT QL REPORTED: YES
GLUCOSE SERPL-MCNC: 106 MG/DL (ref 70–99)
HCT VFR BLD AUTO: 44 % (ref 40–53)
HDLC SERPL-MCNC: 49 MG/DL
HGB BLD-MCNC: 15 G/DL (ref 13.3–17.7)
LDLC SERPL CALC-MCNC: 118 MG/DL
LVEF ECHO: NORMAL
MCH RBC QN AUTO: 29 PG (ref 26.5–33)
MCHC RBC AUTO-ENTMCNC: 34.1 G/DL (ref 31.5–36.5)
MCV RBC AUTO: 85 FL (ref 78–100)
NONHDLC SERPL-MCNC: 137 MG/DL
PLATELET # BLD AUTO: 261 10E3/UL (ref 150–450)
POTASSIUM SERPL-SCNC: 4.2 MMOL/L (ref 3.4–5.3)
RBC # BLD AUTO: 5.17 10E6/UL (ref 4.4–5.9)
SODIUM SERPL-SCNC: 139 MMOL/L (ref 135–145)
TRIGL SERPL-MCNC: 97 MG/DL
WBC # BLD AUTO: 5.8 10E3/UL (ref 4–11)

## 2024-05-13 PROCEDURE — 85027 COMPLETE CBC AUTOMATED: CPT | Performed by: PATHOLOGY

## 2024-05-13 PROCEDURE — 80061 LIPID PANEL: CPT | Performed by: PATHOLOGY

## 2024-05-13 PROCEDURE — 36415 COLL VENOUS BLD VENIPUNCTURE: CPT | Performed by: PATHOLOGY

## 2024-05-13 PROCEDURE — 93306 TTE W/DOPPLER COMPLETE: CPT

## 2024-05-13 PROCEDURE — 99214 OFFICE O/P EST MOD 30 MIN: CPT | Performed by: INTERNAL MEDICINE

## 2024-05-13 PROCEDURE — 93306 TTE W/DOPPLER COMPLETE: CPT | Mod: 26 | Performed by: INTERNAL MEDICINE

## 2024-05-13 PROCEDURE — 80048 BASIC METABOLIC PNL TOTAL CA: CPT | Performed by: PATHOLOGY

## 2024-05-13 PROCEDURE — 99214 OFFICE O/P EST MOD 30 MIN: CPT | Mod: 25 | Performed by: INTERNAL MEDICINE

## 2024-05-13 RX ORDER — AMLODIPINE BESYLATE 10 MG/1
10 TABLET ORAL DAILY
Qty: 90 TABLET | Refills: 3 | Status: SHIPPED | OUTPATIENT
Start: 2024-05-13 | End: 2024-06-20

## 2024-05-13 ASSESSMENT — PAIN SCALES - GENERAL: PAINLEVEL: NO PAIN (0)

## 2024-05-13 NOTE — NURSING NOTE
Chief Complaint   Patient presents with    Follow Up     HTN and aortic regurgitation       Vitals were taken, medications reconciled.    Sy Hamilton, Facilitator   9:36 AM

## 2024-05-13 NOTE — LETTER
5/13/2024      RE: Victor Manuel Cook  [id number]  [facility] [id number]       Dear Colleague,    Thank you for the opportunity to participate in the care of your patient, Victor Manuel Cook, at the [facility] [place] [facility]. Please see a copy of my visit note below.    [facility]  CARDIOVASCULAR MEDICINE CLINIC NOTE    Referring Provider: No ref. provider found   Primary Care Provider: Masoud Molina     Patient Name: Victor Manuel Cook   MRN: [id number]     PERTINENT CLINICAL HISTORY:   Victor Manuel Cook is a 78 year old gentleman w/ aortic insufficiency presenting for clinic f/u.  He is asymptomatic and able to exert himself without issue though limited by knee and lower back pain.  Denies any chest pain, palpitations, SOB, edema, orthopnea, PND, syncope or near syncope.         PAST MEDICAL HISTORY:     Past Medical History:   Diagnosis Date     Aortic regurgitation      HTN (hypertension)         PAST SURGICAL HISTORY:   No past surgical history on file.     CURRENT MEDICATIONS:     Current Outpatient Medications   Medication Sig Dispense Refill     amLODIPine (NORVASC) 5 MG tablet Take 1 tablet (5 mg) by mouth daily 90 tablet 3     aspirin (ASA) 81 MG tablet Take 1 tablet (81 mg) by mouth daily 90 tablet 3     lisinopril (ZESTRIL) 20 MG tablet Take 1 tablet (20 mg) by mouth daily 90 tablet 0        ALLERGIES:   No Known Allergies      PHYSICAL EXAMINATION:   BP (!) 146/73 (BP Location: Right arm, Patient Position: Chair, Cuff Size: Adult Regular)   Pulse 68   Wt 80.5 kg (177 lb 6.4 oz)   SpO2 96%   BMI 29.52 kg/m    Body mass index is 29.52 kg/m .  Wt Readings from Last 2 Encounters:   05/13/24 80.5 kg (177 lb 6.4 oz)   12/30/21 79 kg (174 lb 3.2 oz)     Constitutional: no acute distress, pleasant and cooperative, appears overall well.  Cardiovascular: RRR nl S1S2, JVP not elevated, extremities with no edema or  cyanosis  Respiratory: clear to auscultation and percussion bilaterally anterior and posterior  Gastrointestinal: soft, nontender, non distended, no hepatosplenomegaly or masses  Neurologic: AOx3     LABORATORY DATA:   I have reviewed the labs below.    LIPID RESULTS:  Recent Labs   Lab Test 04/07/23  1058 12/30/21  1035   CHOL 221* 204*   HDL 52 52   * 138*   TRIG 97 71        CBC RESULTS:  Recent Labs   Lab Test 04/07/23  1058 12/30/21  1035   WBC 5.6 5.4   HGB 15.2 15.1   HCT 44.7 46.2    231       BMP RESULTS:  Recent Labs   Lab Test 04/07/23  1058 12/30/21  1035    142   POTASSIUM 4.0 3.6   CHLORIDE 107 108   CO2 22 27   ANIONGAP 11 7   * 102*   BUN 26.1* 19   CR 1.08 0.92   SOCORRO 8.7* 8.6        PROCEDURES & FURTHER ASSESSMENTS:   I have reviewed the test results below.    ECHO: 2/18/2019  Poor acoustic windows.  Global and regional left ventricular function is normal with an EF of 60-65%.  Right ventricular function, chamber size, wall motion, and thickness are  normal.  Mild to moderate aortic insufficiency is present.  The inferior vena cava is normal.  No pericardial effusion is present.  There has been no change.     ECHO: 4/7/2023  Global and regional left ventricular function is normal with an EF of 60-65%.  Global right ventricular function is normal.  Mild to moderate aortic insufficiency is present.  Mild aortic valve calcification is present.  No aortic valve stenosis.    ECHO: 5/13/2024  Left ventricular size, wall motion and function are normal. The ejection  fraction is 60-65%.  Right ventricular function, chamber size, wall motion, and thickness are  normal.  The aortic valve is tricuspid. Mild aortic valve calcification is present. At  least moderate but potentially severe AI is noted. There are several jets and  some are very eccentric making quantification challenging.  The inferior vena cava was normal in size with preserved respiratory  variability. Trivial  pericardial effusion is present.    I personally reviewed the images from the TTE from 5/2024 above.     CLINICAL IMPRESSION:   Victor Manuel Cook is a 78 year old gentleman w/ h/o aortic insufficiency presenting to clinic for routine follow-up.  He was not able to get his TTE.  He will have his TTE when we have slots available.     Aortic Insufficiency: Severe.  LV normal size and LVEF 60%.  Stable by TTE in 2023.  Asymptomatic.  --Repeat TTE in 1 yr  --Afterload reduction as below  --Surgical eval if he develops symptoms, LV dilation, or reduced LV function     HTN:   --Increase amlodipine to 10mg Qday  --Continue lisinopril to 20mg BID  --Continue to monitor at home  --Continue ASA 81mg Qday  --Will call or write with BP measurements in 3 months to evaluate for medication needs.     Hyperlipidemia: will attempt weight loss; would like to try lifestyle modification first     Follow-up: 1 year with TTE    Thank you for allowing us to take part in the care of this very pleasant patient.  Please do not hesitate to call if any further questions or concerns arise.    I spent 30 min today reviewing the medical record, meeting with the patient, and completing this note.    Masoud Molina MD, PhD  Interventional/Critical Care Cardiology  [phone]    May 13, 2024      CC  Patient Care Team:  Masoud Molina MD as PCP - General (Interventional Cardiology)  Anastasia Moseley APRN CNP as Nurse Practitioner (Interventional Cardiology)  Masoud Molina MD as Assigned Heart and Vascular Provider      Optimal Vascular Metrics    Blood Pressure   BP < 140/90 No: Increase meds    On Aspirin  Yes    On Statin  Not indicated    Tobacco use  No       Please do not hesitate to contact me if you have any questions/concerns.     Sincerely,     Masoud Molina MD

## 2024-05-13 NOTE — PROGRESS NOTES
[facility]  CARDIOVASCULAR MEDICINE CLINIC NOTE    Referring Provider: No ref. provider found   Primary Care Provider: Masoud Molina     Patient Name: Victor Manuel Cook   MRN: [id number]     PERTINENT CLINICAL HISTORY:   Victor Manuel Cook is a 78 year old gentleman w/ aortic insufficiency presenting for clinic f/u.  He is asymptomatic and able to exert himself without issue though limited by knee and lower back pain.  Denies any chest pain, palpitations, SOB, edema, orthopnea, PND, syncope or near syncope.         PAST MEDICAL HISTORY:     Past Medical History:   Diagnosis Date    Aortic regurgitation     HTN (hypertension)         PAST SURGICAL HISTORY:   No past surgical history on file.     CURRENT MEDICATIONS:     Current Outpatient Medications   Medication Sig Dispense Refill    amLODIPine (NORVASC) 5 MG tablet Take 1 tablet (5 mg) by mouth daily 90 tablet 3    aspirin (ASA) 81 MG tablet Take 1 tablet (81 mg) by mouth daily 90 tablet 3    lisinopril (ZESTRIL) 20 MG tablet Take 1 tablet (20 mg) by mouth daily 90 tablet 0        ALLERGIES:   No Known Allergies      PHYSICAL EXAMINATION:   BP (!) 146/73 (BP Location: Right arm, Patient Position: Chair, Cuff Size: Adult Regular)   Pulse 68   Wt 80.5 kg (177 lb 6.4 oz)   SpO2 96%   BMI 29.52 kg/m    Body mass index is 29.52 kg/m .  Wt Readings from Last 2 Encounters:   05/13/24 80.5 kg (177 lb 6.4 oz)   12/30/21 79 kg (174 lb 3.2 oz)     Constitutional: no acute distress, pleasant and cooperative, appears overall well.  Cardiovascular: RRR nl S1S2, JVP not elevated, extremities with no edema or cyanosis  Respiratory: clear to auscultation and percussion bilaterally anterior and posterior  Gastrointestinal: soft, nontender, non distended, no hepatosplenomegaly or masses  Neurologic: AOx3     LABORATORY DATA:   I have reviewed the labs below.    LIPID RESULTS:  Recent Labs   Lab Test 04/07/23  1058 12/30/21  1035   CHOL 221* 204*   HDL 52 52   LDL  150* 138*   TRIG 97 71        CBC RESULTS:  Recent Labs   Lab Test 04/07/23  1058 12/30/21  1035   WBC 5.6 5.4   HGB 15.2 15.1   HCT 44.7 46.2    231       BMP RESULTS:  Recent Labs   Lab Test 04/07/23  1058 12/30/21  1035    142   POTASSIUM 4.0 3.6   CHLORIDE 107 108   CO2 22 27   ANIONGAP 11 7   * 102*   BUN 26.1* 19   CR 1.08 0.92   SOCORRO 8.7* 8.6        PROCEDURES & FURTHER ASSESSMENTS:   I have reviewed the test results below.    ECHO: 2/18/2019  Poor acoustic windows.  Global and regional left ventricular function is normal with an EF of 60-65%.  Right ventricular function, chamber size, wall motion, and thickness are  normal.  Mild to moderate aortic insufficiency is present.  The inferior vena cava is normal.  No pericardial effusion is present.  There has been no change.     ECHO: 4/7/2023  Global and regional left ventricular function is normal with an EF of 60-65%.  Global right ventricular function is normal.  Mild to moderate aortic insufficiency is present.  Mild aortic valve calcification is present.  No aortic valve stenosis.    ECHO: 5/13/2024  Left ventricular size, wall motion and function are normal. The ejection  fraction is 60-65%.  Right ventricular function, chamber size, wall motion, and thickness are  normal.  The aortic valve is tricuspid. Mild aortic valve calcification is present. At  least moderate but potentially severe AI is noted. There are several jets and  some are very eccentric making quantification challenging.  The inferior vena cava was normal in size with preserved respiratory  variability. Trivial pericardial effusion is present.    I personally reviewed the images from the TTE from 5/2024 above.     CLINICAL IMPRESSION:   Victor Manuel Cook is a 78 year old gentleman w/ h/o aortic insufficiency presenting to clinic for routine follow-up.  He was not able to get his TTE.  He will have his TTE when we have slots available.     Aortic Insufficiency: Severe.   LV normal size and LVEF 60%.  Stable by TTE in 2023.  Asymptomatic.  --Repeat TTE in 1 yr  --Afterload reduction as below  --Surgical eval if he develops symptoms, LV dilation, or reduced LV function     HTN:   --Increase amlodipine to 10mg Qday  --Continue lisinopril to 20mg BID  --Continue to monitor at home  --Continue ASA 81mg Qday  --Will call or write with BP measurements in 3 months to evaluate for medication needs.     Hyperlipidemia: will attempt weight loss; would like to try lifestyle modification first     Follow-up: 1 year with TTE    Thank you for allowing us to take part in the care of this very pleasant patient.  Please do not hesitate to call if any further questions or concerns arise.    I spent 30 min today reviewing the medical record, meeting with the patient, and completing this note.    Masoud Molina MD, PhD  Interventional/Critical Care Cardiology  [phone]    May 13, 2024      CC  Patient Care Team:  Masoud Molina MD as PCP - General (Interventional Cardiology)  Anastasia Moseley APRN CNP as Nurse Practitioner (Interventional Cardiology)  Masoud Molina MD as Assigned Heart and Vascular Provider

## 2024-05-13 NOTE — PATIENT INSTRUCTIONS
Patient Instructions:  It was a pleasure to see you in the cardiology clinic today.      If you have any questions, call  Katheryn Flores RN, at [phone].   [facility].  To schedule an appointment or to leave a message for your Care Team Press #1  If you are a physician calling for another physician Press #2  For Billing Press #3  For Medical Records Press #4  We are encouraging the use of [email or web address] to communicate with your HealthCare Provider    Note the new medications: increase the amlodipine from 5 mg to 10 mg everyday  Stop the following medications: none    The results from today include: none  Please follow up with Dr. Molina in with and ECHO      If you have an urgent need after hours (8:00 am to 4:30 pm) please call [phone] and ask for the cardiology fellow on call.

## 2024-05-13 NOTE — PROGRESS NOTES
Optimal Vascular Metrics    Blood Pressure   BP < 140/90 No: Increase meds    On Aspirin  Yes    On Statin  Not indicated    Tobacco use  No

## 2024-05-20 ENCOUNTER — TELEPHONE (OUTPATIENT)
Dept: CARDIOLOGY | Facility: CLINIC | Age: 79
End: 2024-05-20

## 2024-05-20 NOTE — TELEPHONE ENCOUNTER
"    S-(situation): called patient after we had increased his amlodipine from 5 mg to 10 mg daily. He is also on lisinopril 20 mg daily  139/63 63  148/66 64  120/62 62  149/76 63    He states he has no chest pain, no shortness of breath. Has noticed that his BP monitor shows \"irregular rhythm\" from time to time. He states he has not felt \"palpitations\"    B-(background): Victor Manuel Cook is a 78 year old gentleman w/ h/o aortic insufficiency presenting to clinic for routine follow-up.  He was not able to get his TTE.  He will have his TTE when we have slots available.     A-(assessment): hypertension    R-(recommendations): review with Dr. Molina    "

## 2024-06-09 ENCOUNTER — HEALTH MAINTENANCE LETTER (OUTPATIENT)
Age: 79
End: 2024-06-09

## 2024-06-20 ENCOUNTER — TELEPHONE (OUTPATIENT)
Dept: CARDIOLOGY | Facility: CLINIC | Age: 79
End: 2024-06-20
Payer: COMMERCIAL

## 2024-06-20 DIAGNOSIS — I10 ESSENTIAL HYPERTENSION: ICD-10-CM

## 2024-06-20 DIAGNOSIS — I10 PRIMARY HYPERTENSION: ICD-10-CM

## 2024-06-20 RX ORDER — LISINOPRIL 20 MG/1
20 TABLET ORAL DAILY
Qty: 90 TABLET | Refills: 3 | Status: SHIPPED | OUTPATIENT
Start: 2024-06-20

## 2024-06-20 RX ORDER — AMLODIPINE BESYLATE 10 MG/1
10 TABLET ORAL DAILY
Qty: 90 TABLET | Refills: 3 | Status: SHIPPED | OUTPATIENT
Start: 2024-06-20

## 2024-06-20 NOTE — TELEPHONE ENCOUNTER
[facility] Call Center    Phone Message    May a detailed message be left on voicemail: yes     Reason for Call: Medication Refill Request    Has the patient contacted the pharmacy for the refill? Yes   Name of medication being requested: lisinopril (ZESTRIL) 20 MG tablet  Provider who prescribed the medication: [facility]  Pharmacy:   [facility] #[id number] [place], [place] - [address] AT [address]     Date medication is needed: 6/20/24    Question or concern regarding medication   Prescription Clarification  Name of Medication:  amLODIPine (NORVASC) 10 MG tablet  Prescribing Provider: [facility]   Pharmacy:   [facility] #[id number] [place], [place] - [address] AT [address]      What on the order needs clarification? Patient called requesting to speak with a member of his care team in regards to the dosage. Please call back to further discuss.      Action Taken: Message routed to:  Other: Cardiology    Travel Screening: Not Applicable     Thank you!  [facility]

## 2025-01-20 ENCOUNTER — TELEPHONE (OUTPATIENT)
Dept: CARDIOLOGY | Facility: CLINIC | Age: 80
End: 2025-01-20
Payer: COMMERCIAL

## 2025-01-20 NOTE — TELEPHONE ENCOUNTER
Left Voicemail (1st Attempt) and Sent Mychart (1st Attempt) for the patient to call back and schedule the following:    Appointment type: RTN CARDIO  Provider: ALEJANDRO  Return date: 5/13/2025  Specialty phone number: [phone] 1  Additional appointment(s) needed: ECHO PRIOR  Additonal Notes: SCHEDULE PT FOR MAY OR JUNE APPT WITH ALEJANDRO AND ECHO PRIOR.

## 2025-01-30 ENCOUNTER — TELEPHONE (OUTPATIENT)
Dept: CARDIOLOGY | Facility: CLINIC | Age: 80
End: 2025-01-30
Payer: COMMERCIAL

## 2025-03-01 ENCOUNTER — HEALTH MAINTENANCE LETTER (OUTPATIENT)
Age: 80
End: 2025-03-01

## 2025-06-04 DIAGNOSIS — I35.9 AORTIC VALVE DISORDER: Primary | ICD-10-CM

## 2025-06-04 DIAGNOSIS — I10 PRIMARY HYPERTENSION: ICD-10-CM

## 2025-06-05 ENCOUNTER — TELEPHONE (OUTPATIENT)
Dept: CARDIOLOGY | Facility: CLINIC | Age: 80
End: 2025-06-05
Payer: COMMERCIAL

## 2025-06-05 NOTE — TELEPHONE ENCOUNTER
Left message to schedule Bartos fasting labs prior to appointment on 6/9.    Samson Beck   [facility]   [address] [place] [id number]  Hours: 8:00am-4:30pm

## 2025-06-07 ENCOUNTER — LAB (OUTPATIENT)
Dept: LAB | Facility: CLINIC | Age: 80
End: 2025-06-07
Payer: COMMERCIAL

## 2025-06-07 DIAGNOSIS — I35.9 AORTIC VALVE DISORDER: ICD-10-CM

## 2025-06-07 DIAGNOSIS — I10 PRIMARY HYPERTENSION: ICD-10-CM

## 2025-06-07 LAB
ANION GAP SERPL CALCULATED.3IONS-SCNC: 10 MMOL/L (ref 7–15)
BUN SERPL-MCNC: 19.1 MG/DL (ref 8–23)
CALCIUM SERPL-MCNC: 8.7 MG/DL (ref 8.8–10.4)
CHLORIDE SERPL-SCNC: 107 MMOL/L (ref 98–107)
CHOLEST SERPL-MCNC: 177 MG/DL
CREAT SERPL-MCNC: 0.99 MG/DL (ref 0.67–1.17)
EGFRCR SERPLBLD CKD-EPI 2021: 77 ML/MIN/1.73M2
ERYTHROCYTE [DISTWIDTH] IN BLOOD BY AUTOMATED COUNT: 13.2 % (ref 10–15)
FASTING STATUS PATIENT QL REPORTED: ABNORMAL
FASTING STATUS PATIENT QL REPORTED: YES
GLUCOSE SERPL-MCNC: 104 MG/DL (ref 70–99)
HCO3 SERPL-SCNC: 23 MMOL/L (ref 22–29)
HCT VFR BLD AUTO: 40.1 % (ref 40–53)
HDLC SERPL-MCNC: 55 MG/DL
HGB BLD-MCNC: 13.6 G/DL (ref 13.3–17.7)
LDLC SERPL CALC-MCNC: 110 MG/DL
MCH RBC QN AUTO: 30.2 PG (ref 26.5–33)
MCHC RBC AUTO-ENTMCNC: 33.9 G/DL (ref 31.5–36.5)
MCV RBC AUTO: 89 FL (ref 78–100)
NONHDLC SERPL-MCNC: 122 MG/DL
PLATELET # BLD AUTO: 269 10E3/UL (ref 150–450)
POTASSIUM SERPL-SCNC: 4.1 MMOL/L (ref 3.4–5.3)
RBC # BLD AUTO: 4.5 10E6/UL (ref 4.4–5.9)
SODIUM SERPL-SCNC: 140 MMOL/L (ref 135–145)
TRIGL SERPL-MCNC: 60 MG/DL
WBC # BLD AUTO: 7.1 10E3/UL (ref 4–11)

## 2025-06-07 PROCEDURE — 85027 COMPLETE CBC AUTOMATED: CPT | Performed by: PATHOLOGY

## 2025-06-07 PROCEDURE — 36415 COLL VENOUS BLD VENIPUNCTURE: CPT | Performed by: PATHOLOGY

## 2025-06-07 PROCEDURE — 80061 LIPID PANEL: CPT | Performed by: PATHOLOGY

## 2025-06-07 PROCEDURE — 80048 BASIC METABOLIC PNL TOTAL CA: CPT | Performed by: PATHOLOGY

## 2025-06-09 ENCOUNTER — ANCILLARY PROCEDURE (OUTPATIENT)
Dept: CARDIOLOGY | Facility: CLINIC | Age: 80
End: 2025-06-09
Attending: INTERNAL MEDICINE
Payer: COMMERCIAL

## 2025-06-09 VITALS
DIASTOLIC BLOOD PRESSURE: 62 MMHG | BODY MASS INDEX: 26.49 KG/M2 | OXYGEN SATURATION: 99 % | HEART RATE: 63 BPM | SYSTOLIC BLOOD PRESSURE: 133 MMHG | WEIGHT: 159.2 LBS

## 2025-06-09 DIAGNOSIS — I10 PRIMARY HYPERTENSION: ICD-10-CM

## 2025-06-09 DIAGNOSIS — I10 ESSENTIAL HYPERTENSION: ICD-10-CM

## 2025-06-09 DIAGNOSIS — I35.9 AORTIC VALVE DISORDER: ICD-10-CM

## 2025-06-09 LAB — LVEF ECHO: NORMAL

## 2025-06-09 PROCEDURE — 1126F AMNT PAIN NOTED NONE PRSNT: CPT | Performed by: INTERNAL MEDICINE

## 2025-06-09 PROCEDURE — 99214 OFFICE O/P EST MOD 30 MIN: CPT | Mod: 25 | Performed by: INTERNAL MEDICINE

## 2025-06-09 PROCEDURE — 93306 TTE W/DOPPLER COMPLETE: CPT | Performed by: INTERNAL MEDICINE

## 2025-06-09 PROCEDURE — 99213 OFFICE O/P EST LOW 20 MIN: CPT | Performed by: INTERNAL MEDICINE

## 2025-06-09 PROCEDURE — 3078F DIAST BP <80 MM HG: CPT | Performed by: INTERNAL MEDICINE

## 2025-06-09 PROCEDURE — 3075F SYST BP GE 130 - 139MM HG: CPT | Performed by: INTERNAL MEDICINE

## 2025-06-09 RX ORDER — LISINOPRIL 20 MG/1
20 TABLET ORAL DAILY
Qty: 90 TABLET | Refills: 3 | Status: SHIPPED | OUTPATIENT
Start: 2025-06-09

## 2025-06-09 ASSESSMENT — PAIN SCALES - GENERAL: PAINLEVEL_OUTOF10: NO PAIN (0)

## 2025-06-09 NOTE — PATIENT INSTRUCTIONS
It was a pleasure to see you in the cardiology clinic today.    If you have any questions, call Arabella Forbes RN, at [phone].     [facility].  To schedule an appointment or to leave a message for your Care Team Press #1  If you are a physician calling for another physician Press #2  For Billing Press #3  For Medical Records Press #4  We are encouraging the use of [employer]cariot to communicate with your HealthCare Provider    PATIENT INSTRUCTIONS:  TESTS:  Echo 1 year w/follow up     Please follow up with Dr. Molina 1 year

## 2025-06-09 NOTE — NURSING NOTE
Chief Complaint   Patient presents with    Follow Up     annual follow up, HTN and aortic regurgitation       Vitals were taken, medications reconciled.    Krystina Carrasco, EMT    10:27 AM

## 2025-06-09 NOTE — NURSING NOTE
PATIENT INSTRUCTIONS:  TESTS:  Echo 1 year w/follow up     Please follow up with Dr. Molina 1 year

## 2025-06-09 NOTE — PROGRESS NOTES
CARDIOLOGY CLINIC FOLLOW UP    HPI: Victor Manuel Cook is a 79 year old male, being seen today for recheck of AR.     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     Today, he reports no CP, SOB, PND, leg edema. NO syncope or LOC. Pt reports BP at home ranging 120-130/55-60s mmhg. Not very physically active but reports it is more a life style change rather than physical limitation causing it.     PAST MEDICAL HISTORY:  Past Medical History:   Diagnosis Date    Aortic regurgitation     HTN (hypertension)        CURRENT MEDICATIONS:  Current Outpatient Medications   Medication Sig Dispense Refill    amLODIPine (NORVASC) 10 MG tablet Take 1 tablet (10 mg) by mouth daily 90 tablet 3    aspirin (ASA) 81 MG tablet Take 1 tablet (81 mg) by mouth daily 90 tablet 3    lisinopril (ZESTRIL) 20 MG tablet Take 1 tablet (20 mg) by mouth daily 90 tablet 3       PAST SURGICAL HISTORY:  No past surgical history on file.    ALLERGIES  Patient has no known allergies.    FAMILY HX:  No family history on file.    SOCIAL HX:  Social History     Socioeconomic History    Marital status:     Number of children: 2   Occupational History    Occupation: used to work in [employer]     Employer: RETIRED   Tobacco Use    Smoking status: Former     Types: Cigarettes    Smokeless tobacco: Never    Tobacco comments:     Smoked rarely when he was teenager and in college.    Substance and Sexual Activity    Alcohol use: No    Drug use: No       ROS:  Constitutional: No fever, chills, or sweats. No weight gain/loss.   ENT: No visual disturbance, ear ache, epistaxis, sore throat.   Allergies/Immunologic: Negative.   Respiratory: No cough, hemoptysis.   Cardiovascular: As per HPI.   GI: No nausea, vomiting, hematemesis, melena, or hematochezia.   : No urinary frequency, dysuria, or hematuria.   Integument: Negative.   Psychiatric: Negative.   Neuro:  "Negative.   Endocrinology: Negative.   Musculoskeletal: No myalgia.    VITAL SIGNS:  There were no vitals taken for this visit.  There is no height or weight on file to calculate BMI.  Wt Readings from Last 2 Encounters:   05/13/24 80.5 kg (177 lb 6.4 oz)   12/30/21 79 kg (174 lb 3.2 oz)       PHYSICAL EXAM  Victor Manuel Cook is a 79 year old male in no acute distress.  HEENT: Unremarkable.  Neck: JVP normal.  Carotids +4/4 bilaterally without bruits.  Lungs: CTA.  Cor: RRR. Normal S1 and S2.  Diastolic murmur in II intercostal space, no rub, or gallop.  PMI in Lf 5th ICS.  Abd: Soft, nontender, nondistended.  NABS.  No pulsatile mass.  Extremities: No C/C/E.  Pulses +4/4 symmetric in upper and lower extremities.  Neuro: Grossly intact.    LABS    Lab Results   Component Value Date    WBC 7.1 06/07/2025    WBC 5.5 10/08/2019     Lab Results   Component Value Date    RBC 4.50 06/07/2025    RBC 5.19 10/08/2019     Lab Results   Component Value Date    HGB 13.6 06/07/2025    HGB 15.4 10/08/2019     Lab Results   Component Value Date    HCT 40.1 06/07/2025    HCT 45.9 10/08/2019     No components found for: \"MCT\"  Lab Results   Component Value Date    MCV 89 06/07/2025    MCV 88 10/08/2019     Lab Results   Component Value Date    MCH 30.2 06/07/2025    MCH 29.7 10/08/2019     Lab Results   Component Value Date    MCHC 33.9 06/07/2025    MCHC 33.6 10/08/2019     Lab Results   Component Value Date    RDW 13.2 06/07/2025    RDW 13.3 10/08/2019     Lab Results   Component Value Date     06/07/2025     10/08/2019      Recent Labs   Lab Test 06/07/25  1134 05/13/24  0928    139   POTASSIUM 4.1 4.2   CHLORIDE 107 107   CO2 23 21*   ANIONGAP 10 11   * 106*   BUN 19.1 20.2   CR 0.99 1.17   SOCORRO 8.7* 8.6*     Recent Labs   Lab Test 06/07/25  1134 05/13/24  0928   CHOL 177 186   HDL 55 49   * 118*   TRIG 60 97   NHDL 122 137*       ECHO:     5/13/23:    Interpretation Summary  Left ventricular " size, wall motion and function are normal. The ejection  fraction is 60-65%.  Right ventricular function, chamber size, wall motion, and thickness are  normal.  The aortic valve is tricuspid. Mild aortic valve calcification is present. At  least moderate but potentially severe AI is noted. There are several jets and  some are very eccentric making quantification challenging.  The inferior vena cava was normal in size with preserved respiratory  variability. Trivial pericardial effusion is present.    STRESS TEST:  na    CARDIAC CATH:      ASSESSMENT AND PLAN:    Victor Manuel Cook is a 79 year old male with PMH is significant for known moderate to severe AI most likely 2/2 restricted leaflet motion given calcified nodules,HTN, HLD who presents for the follow-up.     #AR moderate to severe with a very eccentric jet (most likely type 3 dominant mechanism leaflet restriction yet)  -- asymptomatic   --Repeat TTE in 1 yr  --Afterload reduction as below  --Surgical eval if he develops symptoms, LV dilation, or reduced LV function or progressive LVEF on serial imaging     #HTN  - c/w amlodipine and lisinopril   - c/w ASA     #HLD  - pt reports that he changed his diet and his LDL is trending down. If goal not archived then we can consider starting statin.    Thank you for allowing me to care for this patient, please don't hesitate to contact me with any questions regarding this plan.     Pt was discussed and evaluated with Masoud Pulido MD, attending physician, who agrees with the assessment and plan above.    Zeb Leyva MD, PhD, MSCE  Cardiology Fellow    Recheck: 1 year with echo

## 2025-06-09 NOTE — LETTER
6/9/2025      RE: Victor Manuel Cook  [id number]  [facility] [address]       Dear Colleague,    Thank you for the opportunity to participate in the care of your patient, Victor Manuel Cook, at the [facility] [place] at [facility]. Please see a copy of my visit note below.    CARDIOLOGY CLINIC FOLLOW UP    HPI: Victor Manuel Cook is a 79 year old male, being seen today for recheck of AR.     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     Today, he reports no CP, SOB, PND, leg edema. NO syncope or LOC. Pt reports BP at home ranging 120-130/55-60s mmhg. Not very physically active but reports it is more a life style change rather than physical limitation causing it.     PAST MEDICAL HISTORY:  Past Medical History:   Diagnosis Date     Aortic regurgitation      HTN (hypertension)        CURRENT MEDICATIONS:  Current Outpatient Medications   Medication Sig Dispense Refill     amLODIPine (NORVASC) 10 MG tablet Take 1 tablet (10 mg) by mouth daily 90 tablet 3     aspirin (ASA) 81 MG tablet Take 1 tablet (81 mg) by mouth daily 90 tablet 3     lisinopril (ZESTRIL) 20 MG tablet Take 1 tablet (20 mg) by mouth daily 90 tablet 3       PAST SURGICAL HISTORY:  No past surgical history on file.    ALLERGIES  Patient has no known allergies.    FAMILY HX:  No family history on file.    SOCIAL HX:  Social History     Socioeconomic History     Marital status:      Number of children: 2   Occupational History     Occupation: used to work in [employer]     Employer: RETIRED   Tobacco Use     Smoking status: Former     Types: Cigarettes     Smokeless tobacco: Never     Tobacco comments:     Smoked rarely when he was teenager and in college.    Substance and Sexual Activity     Alcohol use: No     Drug use: No       ROS:  Constitutional: No fever, chills, or sweats. No  "weight gain/loss.   ENT: No visual disturbance, ear ache, epistaxis, sore throat.   Allergies/Immunologic: Negative.   Respiratory: No cough, hemoptysis.   Cardiovascular: As per HPI.   GI: No nausea, vomiting, hematemesis, melena, or hematochezia.   : No urinary frequency, dysuria, or hematuria.   Integument: Negative.   Psychiatric: Negative.   Neuro: Negative.   Endocrinology: Negative.   Musculoskeletal: No myalgia.    VITAL SIGNS:  There were no vitals taken for this visit.  There is no height or weight on file to calculate BMI.  Wt Readings from Last 2 Encounters:   05/13/24 80.5 kg (177 lb 6.4 oz)   12/30/21 79 kg (174 lb 3.2 oz)       PHYSICAL EXAM  Victor Manuel Cook is a 79 year old male in no acute distress.  HEENT: Unremarkable.  Neck: JVP normal.  Carotids +4/4 bilaterally without bruits.  Lungs: CTA.  Cor: RRR. Normal S1 and S2.  Diastolic murmur in II intercostal space, no rub, or gallop.  PMI in Lf 5th ICS.  Abd: Soft, nontender, nondistended.  NABS.  No pulsatile mass.  Extremities: No C/C/E.  Pulses +4/4 symmetric in upper and lower extremities.  Neuro: Grossly intact.    LABS    Lab Results   Component Value Date    WBC 7.1 06/07/2025    WBC 5.5 10/08/2019     Lab Results   Component Value Date    RBC 4.50 06/07/2025    RBC 5.19 10/08/2019     Lab Results   Component Value Date    HGB 13.6 06/07/2025    HGB 15.4 10/08/2019     Lab Results   Component Value Date    HCT 40.1 06/07/2025    HCT 45.9 10/08/2019     No components found for: \"MCT\"  Lab Results   Component Value Date    MCV 89 06/07/2025    MCV 88 10/08/2019     Lab Results   Component Value Date    MCH 30.2 06/07/2025    MCH 29.7 10/08/2019     Lab Results   Component Value Date    MCHC 33.9 06/07/2025    MCHC 33.6 10/08/2019     Lab Results   Component Value Date    RDW 13.2 06/07/2025    RDW 13.3 10/08/2019     Lab Results   Component Value Date     06/07/2025     10/08/2019      Recent Labs   Lab Test 06/07/25  1134 " 05/13/24  0928    139   POTASSIUM 4.1 4.2   CHLORIDE 107 107   CO2 23 21*   ANIONGAP 10 11   * 106*   BUN 19.1 20.2   CR 0.99 1.17   SOCORRO 8.7* 8.6*     Recent Labs   Lab Test 06/07/25  1134 05/13/24  0928   CHOL 177 186   HDL 55 49   * 118*   TRIG 60 97   NHDL 122 137*       ECHO:     5/13/23:    Interpretation Summary  Left ventricular size, wall motion and function are normal. The ejection  fraction is 60-65%.  Right ventricular function, chamber size, wall motion, and thickness are  normal.  The aortic valve is tricuspid. Mild aortic valve calcification is present. At  least moderate but potentially severe AI is noted. There are several jets and  some are very eccentric making quantification challenging.  The inferior vena cava was normal in size with preserved respiratory  variability. Trivial pericardial effusion is present.    STRESS TEST:  na    CARDIAC CATH:      ASSESSMENT AND PLAN:    Victor Manuel Cook is a 79 year old male with PMH is significant for known moderate to severe AI most likely 2/2 restricted leaflet motion given calcified nodules,HTN, HLD who presents for the follow-up.     #AR moderate to severe with a very eccentric jet (most likely type 3 dominant mechanism leaflet restriction yet)  -- asymptomatic   --Repeat TTE in 1 yr  --Afterload reduction as below  --Surgical eval if he develops symptoms, LV dilation, or reduced LV function or progressive LVEF on serial imaging     #HTN  - c/w amlodipine and lisinopril   - c/w ASA     #HLD  - pt reports that he changed his diet and his LDL is trending down. If goal not archived then we can consider starting statin.    Thank you for allowing me to care for this patient, please don't hesitate to contact me with any questions regarding this plan.     Pt was discussed and evaluated with Masoud Pulido MD, attending physician, who agrees with the assessment and plan above.    Zeb Leyva MD, PhD, MSCE  Cardiology  Fellow    Recheck: 1 year with echo             Attestation signed by Masoud Molina MD at 6/9/2025 11:42 AM:  ATTENDING ATTESTATION:   I personally examined and evaluated this patient on June 9, 2025. I have personally reviewed today's vital signs, medications, all labs, and all imaging/cardiac studies described above. I have reviewed and edited, as necessary, the history, review of systems, physical examination, and assessment and plan.  I discussed the patient with Dr. Leyva and agree with the assessment and plan of care as documented in the note above.  I personally spent 30 min today reviewing the medical record, meeting with the patient, and completing this note.  Thank you for allowing us to take part in the care of this very pleasant patient.  Please do not hesitate to call if any further questions or concerns arise.    Masoud Molina MD, PhD  Interventional/Critical Care Cardiology  [phone]    June 9, 2025      Please do not hesitate to contact me if you have any questions/concerns.     Sincerely,     Masoud Molina MD

## 2025-06-11 DIAGNOSIS — I10 ESSENTIAL HYPERTENSION: ICD-10-CM

## 2025-06-12 RX ORDER — AMLODIPINE BESYLATE 10 MG/1
10 TABLET ORAL DAILY
Qty: 90 TABLET | Refills: 3 | Status: SHIPPED | OUTPATIENT
Start: 2025-06-12

## 2025-06-12 NOTE — TELEPHONE ENCOUNTER
Last Written Prescription:  amLODIPine (NORVASC) 10 MG tablet 90 tablet 3 6/20/2024 -- No   Sig - Route: Take 1 tablet (10 mg) by mouth daily - Oral     ----------------------  Last Visit Date:   6/9/2025  [facility]    Future Visit Date: 0  ----------------------      Refill decision: Medication refilled per  Medication Refill in Ambulatory Care  policy.      Last Comprehensive Metabolic Panel:  Lab Results   Component Value Date     06/07/2025    POTASSIUM 4.1 06/07/2025    CHLORIDE 107 06/07/2025    CO2 23 06/07/2025    ANIONGAP 10 06/07/2025     (H) 06/07/2025    BUN 19.1 06/07/2025    CR 0.99 06/07/2025    GFRESTIMATED 77 06/07/2025    SOCORRO 8.7 (L) 06/07/2025     BP Readings from Last 3 Encounters:   06/09/25 133/62   05/13/24 (!) 146/73   04/10/23 (!) 158/71         Request from pharmacy:  Requested Prescriptions   Pending Prescriptions Disp Refills    amLODIPine (NORVASC) 10 MG tablet [Pharmacy Med Name: AMLODIPINE BESYLATE 10MG TABLETS] 90 tablet 3     Sig: TAKE 1 TABLET(10 MG) BY MOUTH DAILY       Calcium Channel Blockers Protocol  Passed - 6/12/2025 10:17 AM        Passed - Most recent blood pressure under 140/90 in past 12 months     BP Readings from Last 3 Encounters:   06/09/25 133/62   05/13/24 (!) 146/73   04/10/23 (!) 158/71       No data recorded            Passed - Medication is active on med list and the sig matches. RN to manually verify dose and sig if red X/fail.     If the protocol passes (green check), you do not need to verify med dose and sig.    A prescription matches if they are the same clinical intention.    For Example: once daily and every morning are the same.    The protocol can not identify upper and lower case letters as matching and will fail.     For Example: Take 1 tablet (50 mg) by mouth daily     TAKE 1 TABLET (50 MG) BY MOUTH DAILY    For all fails (red x), verify dose and sig.    If the refill does match what is on file, the RN can  still proceed to approve the refill request.       If they do not match, route to the appropriate provider.             Passed - Medication is indicated for associated diagnosis        Passed - GFR is on file in the past 12 months and most recent GFR is normal        Passed - Recent (12 month) or future (90 days) visit with authorizing provider's specialty (provided they have been seen in the past 15 months)     The patient must have completed an in-person or virtual visit within the past 12 months or has a future visit scheduled within the next 90 days with the authorizing provider s specialty.  Urgent care and e-visits do not qualify as an office visit for this protocol.          Passed - Patient is age 18 or older

## 2025-06-15 ENCOUNTER — HEALTH MAINTENANCE LETTER (OUTPATIENT)
Age: 80
End: 2025-06-15